# Patient Record
Sex: MALE | Race: WHITE | NOT HISPANIC OR LATINO | Employment: UNEMPLOYED | URBAN - METROPOLITAN AREA
[De-identification: names, ages, dates, MRNs, and addresses within clinical notes are randomized per-mention and may not be internally consistent; named-entity substitution may affect disease eponyms.]

---

## 2017-06-14 ENCOUNTER — HOSPITAL ENCOUNTER (EMERGENCY)
Facility: HOSPITAL | Age: 32
Discharge: HOME | End: 2017-06-14
Attending: EMERGENCY MEDICINE
Payer: MEDICAID

## 2017-06-14 VITALS
RESPIRATION RATE: 20 BRPM | SYSTOLIC BLOOD PRESSURE: 127 MMHG | OXYGEN SATURATION: 98 % | HEART RATE: 83 BPM | BODY MASS INDEX: 24.79 KG/M2 | WEIGHT: 183 LBS | DIASTOLIC BLOOD PRESSURE: 74 MMHG | HEIGHT: 72 IN | TEMPERATURE: 98 F

## 2017-06-14 DIAGNOSIS — S91.011A LACERATION OF RIGHT ANKLE, INITIAL ENCOUNTER: ICD-10-CM

## 2017-06-14 DIAGNOSIS — T14.90XA TRAUMA: ICD-10-CM

## 2017-06-14 DIAGNOSIS — S01.81XA FOREHEAD LACERATION, INITIAL ENCOUNTER: ICD-10-CM
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
## 2017-06-14 DIAGNOSIS — S92.301A FRACTURE OF UNSPECIFIED METATARSAL BONE(S), RIGHT FOOT, INITIAL ENCOUNTER FOR CLOSED FRACTURE: Primary | ICD-10-CM

## 2017-06-14 LAB
ABO + RH BLD: NORMAL
ALBUMIN SERPL BCP-MCNC: 4.3 G/DL
ALP SERPL-CCNC: 93 U/L
ALT SERPL W/O P-5'-P-CCNC: 23 U/L
AMPHET+METHAMPHET UR QL: NEGATIVE
ANION GAP SERPL CALC-SCNC: 12 MMOL/L
AST SERPL-CCNC: 19 U/L
BARBITURATES UR QL SCN>200 NG/ML: NEGATIVE
BASOPHILS # BLD AUTO: 0 K/UL
BASOPHILS NFR BLD: 0.3 %
BENZODIAZ UR QL SCN>200 NG/ML: NEGATIVE
BILIRUB SERPL-MCNC: 0.4 MG/DL
BILIRUB UR QL STRIP: NEGATIVE
BLD GP AB SCN CELLS X3 SERPL QL: NORMAL
BUN SERPL-MCNC: 9 MG/DL
BZE UR QL SCN: NEGATIVE
CALCIUM SERPL-MCNC: 9.4 MG/DL
CANNABINOIDS UR QL SCN: NEGATIVE
CHLORIDE SERPL-SCNC: 107 MMOL/L
CLARITY UR: CLEAR
CO2 SERPL-SCNC: 24 MMOL/L
COLOR UR: YELLOW
CREAT SERPL-MCNC: 0.9 MG/DL
CREAT UR-MCNC: 89.1 MG/DL
DIFFERENTIAL METHOD: ABNORMAL
EOSINOPHIL # BLD AUTO: 0.2 K/UL
EOSINOPHIL NFR BLD: 1.5 %
ERYTHROCYTE [DISTWIDTH] IN BLOOD BY AUTOMATED COUNT: 13.2 %
EST. GFR  (AFRICAN AMERICAN): >60 ML/MIN/1.73 M^2
EST. GFR  (NON AFRICAN AMERICAN): >60 ML/MIN/1.73 M^2
ETHANOL SERPL-MCNC: <10 MG/DL
GLUCOSE SERPL-MCNC: 125 MG/DL
GLUCOSE UR QL STRIP: NEGATIVE
HCT VFR BLD AUTO: 47.6 %
HGB BLD-MCNC: 16.1 G/DL
HGB UR QL STRIP: NEGATIVE
KETONES UR QL STRIP: NEGATIVE
LEUKOCYTE ESTERASE UR QL STRIP: NEGATIVE
LYMPHOCYTES # BLD AUTO: 5 K/UL
LYMPHOCYTES NFR BLD: 35.2 %
MCH RBC QN AUTO: 29.8 PG
MCHC RBC AUTO-ENTMCNC: 33.9 %
MCV RBC AUTO: 88 FL
METHADONE UR QL SCN>300 NG/ML: NEGATIVE
MONOCYTES # BLD AUTO: 1.3 K/UL
MONOCYTES NFR BLD: 9.2 %
NEUTROPHILS # BLD AUTO: 7.6 K/UL
NEUTROPHILS NFR BLD: 53.8 %
NITRITE UR QL STRIP: NEGATIVE
OPIATES UR QL SCN: NEGATIVE
PCP UR QL SCN>25 NG/ML: NEGATIVE
PH UR STRIP: 6 [PH] (ref 5–8)
PLATELET # BLD AUTO: 252 K/UL
PMV BLD AUTO: 9.5 FL
POTASSIUM SERPL-SCNC: 3.5 MMOL/L
PROT SERPL-MCNC: 7.4 G/DL
PROT UR QL STRIP: NEGATIVE
RBC # BLD AUTO: 5.41 M/UL
SODIUM SERPL-SCNC: 143 MMOL/L
SP GR UR STRIP: 1.01 (ref 1–1.03)
TOXICOLOGY INFORMATION: NORMAL
URN SPEC COLLECT METH UR: NORMAL
UROBILINOGEN UR STRIP-ACNC: NEGATIVE EU/DL
WBC # BLD AUTO: 14.1 K/UL

## 2017-06-14 PROCEDURE — 12002 RPR S/N/AX/GEN/TRNK2.6-7.5CM: CPT

## 2017-06-14 PROCEDURE — 86900 BLOOD TYPING SEROLOGIC ABO: CPT

## 2017-06-14 PROCEDURE — 90715 TDAP VACCINE 7 YRS/> IM: CPT

## 2017-06-14 PROCEDURE — 85025 COMPLETE CBC W/AUTO DIFF WBC: CPT

## 2017-06-14 PROCEDURE — 80053 COMPREHEN METABOLIC PANEL: CPT

## 2017-06-14 PROCEDURE — 96375 TX/PRO/DX INJ NEW DRUG ADDON: CPT

## 2017-06-14 PROCEDURE — 96365 THER/PROPH/DIAG IV INF INIT: CPT

## 2017-06-14 PROCEDURE — 80320 DRUG SCREEN QUANTALCOHOLS: CPT

## 2017-06-14 PROCEDURE — 86901 BLOOD TYPING SEROLOGIC RH(D): CPT

## 2017-06-14 PROCEDURE — 90471 IMMUNIZATION ADMIN: CPT

## 2017-06-14 PROCEDURE — 36415 COLL VENOUS BLD VENIPUNCTURE: CPT

## 2017-06-14 PROCEDURE — 63600175 PHARM REV CODE 636 W HCPCS: Performed by: EMERGENCY MEDICINE

## 2017-06-14 PROCEDURE — 25500020 PHARM REV CODE 255

## 2017-06-14 PROCEDURE — 25000003 PHARM REV CODE 250: Performed by: EMERGENCY MEDICINE

## 2017-06-14 PROCEDURE — 99285 EMERGENCY DEPT VISIT HI MDM: CPT | Mod: 25

## 2017-06-14 PROCEDURE — 12011 RPR F/E/E/N/L/M 2.5 CM/<: CPT

## 2017-06-14 PROCEDURE — 80307 DRUG TEST PRSMV CHEM ANLYZR: CPT

## 2017-06-14 PROCEDURE — 81003 URINALYSIS AUTO W/O SCOPE: CPT

## 2017-06-14 PROCEDURE — 63600175 PHARM REV CODE 636 W HCPCS

## 2017-06-14 RX ORDER — CEPHALEXIN 500 MG/1
500 CAPSULE ORAL 4 TIMES DAILY
Qty: 20 CAPSULE | Refills: 0 | Status: SHIPPED | OUTPATIENT
Start: 2017-06-14 | End: 2017-06-19

## 2017-06-14 RX ORDER — DEXTROMETHORPHAN HYDROBROMIDE, GUAIFENESIN 5; 100 MG/5ML; MG/5ML
650 LIQUID ORAL EVERY 8 HOURS
Qty: 30 TABLET | Refills: 0 | Status: SHIPPED | OUTPATIENT
Start: 2017-06-14 | End: 2017-07-03

## 2017-06-14 RX ORDER — ACETAMINOPHEN 10 MG/ML
1000 INJECTION, SOLUTION INTRAVENOUS
Status: COMPLETED | OUTPATIENT
Start: 2017-06-14 | End: 2017-06-14

## 2017-06-14 RX ORDER — IBUPROFEN 200 MG
1 TABLET ORAL
Status: DISCONTINUED | OUTPATIENT
Start: 2017-06-14 | End: 2017-06-14 | Stop reason: HOSPADM

## 2017-06-14 RX ORDER — IBUPROFEN 800 MG/1
800 TABLET ORAL EVERY 6 HOURS PRN
Qty: 30 TABLET | Refills: 0 | Status: SHIPPED | OUTPATIENT
Start: 2017-06-14 | End: 2017-07-12

## 2017-06-14 RX ORDER — CEFAZOLIN SODIUM 1 G/3ML
1 INJECTION, POWDER, FOR SOLUTION INTRAMUSCULAR; INTRAVENOUS
Status: DISCONTINUED | OUTPATIENT
Start: 2017-06-14 | End: 2017-06-14

## 2017-06-14 RX ORDER — FAMOTIDINE 20 MG/1
20 TABLET, FILM COATED ORAL 2 TIMES DAILY
Qty: 20 TABLET | Refills: 0 | Status: SHIPPED | OUTPATIENT
Start: 2017-06-14 | End: 2017-11-27

## 2017-06-14 RX ORDER — LIDOCAINE HYDROCHLORIDE 20 MG/ML
INJECTION, SOLUTION INFILTRATION; PERINEURAL
Status: DISCONTINUED
Start: 2017-06-14 | End: 2017-06-14 | Stop reason: HOSPADM

## 2017-06-14 RX ORDER — CEFAZOLIN SODIUM 1 G/50ML
SOLUTION INTRAVENOUS
Status: DISCONTINUED
Start: 2017-06-14 | End: 2017-06-14 | Stop reason: HOSPADM

## 2017-06-14 RX ORDER — CEFAZOLIN SODIUM 1 G/50ML
1 SOLUTION INTRAVENOUS ONCE
Status: COMPLETED | OUTPATIENT
Start: 2017-06-14 | End: 2017-06-14

## 2017-06-14 RX ORDER — KETOROLAC TROMETHAMINE 30 MG/ML
INJECTION, SOLUTION INTRAMUSCULAR; INTRAVENOUS
Status: DISCONTINUED
Start: 2017-06-14 | End: 2017-06-14 | Stop reason: HOSPADM

## 2017-06-14 RX ORDER — KETOROLAC TROMETHAMINE 30 MG/ML
10 INJECTION, SOLUTION INTRAMUSCULAR; INTRAVENOUS
Status: COMPLETED | OUTPATIENT
Start: 2017-06-14 | End: 2017-06-14

## 2017-06-14 RX ADMIN — CLOSTRIDIUM TETANI TOXOID ANTIGEN (FORMALDEHYDE INACTIVATED), CORYNEBACTERIUM DIPHTHERIAE TOXOID ANTIGEN (FORMALDEHYDE INACTIVATED), BORDETELLA PERTUSSIS TOXOID ANTIGEN (GLUTARALDEHYDE INACTIVATED), BORDETELLA PERTUSSIS FILAMENTOUS HEMAGGLUTININ ANTIGEN (FORMALDEHYDE INACTIVATED), BORDETELLA PERTUSSIS PERTACTIN ANTIGEN, AND BORDETELLA PERTUSSIS FIMBRIAE 2/3 ANTIGEN 0.5 ML: 5; 2; 2.5; 5; 3; 5 INJECTION, SUSPENSION INTRAMUSCULAR at 07:06

## 2017-06-14 RX ADMIN — KETOROLAC TROMETHAMINE 10 MG: 30 INJECTION, SOLUTION INTRAMUSCULAR at 08:06

## 2017-06-14 RX ADMIN — ACETAMINOPHEN 1000 MG: 10 INJECTION, SOLUTION INTRAVENOUS at 06:06

## 2017-06-14 RX ADMIN — SODIUM CHLORIDE, SODIUM LACTATE, POTASSIUM CHLORIDE, AND CALCIUM CHLORIDE 1000 ML: .6; .31; .03; .02 INJECTION, SOLUTION INTRAVENOUS at 06:06

## 2017-06-14 RX ADMIN — CEFAZOLIN SODIUM 1 G: 1 SOLUTION INTRAVENOUS at 06:06

## 2017-06-14 RX ADMIN — IOHEXOL 100 ML: 350 INJECTION, SOLUTION INTRAVENOUS at 06:06

## 2017-06-14 RX ADMIN — NICOTINE 1 PATCH: 21 PATCH, EXTENDED RELEASE TRANSDERMAL at 11:06
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
## 2017-06-14 NOTE — ED NOTES
Discharge instructions and prescriptions reviewed with patient and with his mom. Patient asked many questions and repeated the same questions many times, and all were answered. Crutches provided with training and patient demonstrated correct use.

## 2017-06-14 NOTE — DISCHARGE INSTRUCTIONS
If you would like to follow up with the [facility] for further care of your fracture, please ensure you have a CD with your x-rays or other images taken at your visit today.  Please bring this CD and your discharge papers to [facility], [address] [place] [phone] - first floor Registration Desk in the Ambulatory Care Building.  Upon receipt of your information and CD, you will be scheduled for follow up in the Orthopedic Clinic

## 2017-06-19 ENCOUNTER — HOSPITAL ENCOUNTER (EMERGENCY)
Facility: HOSPITAL | Age: 32
Discharge: HOME | End: 2017-06-19
Attending: EMERGENCY MEDICINE
Payer: MEDICAID

## 2017-06-19 VITALS
RESPIRATION RATE: 18 BRPM | BODY MASS INDEX: 24.79 KG/M2 | SYSTOLIC BLOOD PRESSURE: 123 MMHG | TEMPERATURE: 98 F | OXYGEN SATURATION: 97 % | HEART RATE: 79 BPM | DIASTOLIC BLOOD PRESSURE: 57 MMHG | HEIGHT: 72 IN | WEIGHT: 183 LBS

## 2017-06-19 DIAGNOSIS — Z51.89 ENCOUNTER FOR POST-TRAUMATIC WOUND CHECK: Primary | ICD-10-CM

## 2017-06-19 PROCEDURE — 29515 APPLICATION SHORT LEG SPLINT: CPT | Mod: RT

## 2017-06-19 PROCEDURE — 99284 EMERGENCY DEPT VISIT MOD MDM: CPT | Mod: 25

## 2017-06-19 PROCEDURE — 96372 THER/PROPH/DIAG INJ SC/IM: CPT

## 2017-06-19 PROCEDURE — 63600175 PHARM REV CODE 636 W HCPCS: Performed by: EMERGENCY MEDICINE

## 2017-06-19 RX ORDER — KETOROLAC TROMETHAMINE 30 MG/ML
30 INJECTION, SOLUTION INTRAMUSCULAR; INTRAVENOUS
Status: COMPLETED | OUTPATIENT
Start: 2017-06-19 | End: 2017-06-19

## 2017-06-19 RX ORDER — TRAMADOL HYDROCHLORIDE 50 MG/1
50 TABLET ORAL EVERY 8 HOURS PRN
Qty: 20 TABLET | Refills: 0 | Status: SHIPPED | OUTPATIENT
Start: 2017-06-19 | End: 2017-06-22

## 2017-06-19 RX ADMIN — KETOROLAC TROMETHAMINE 30 MG: 30 INJECTION, SOLUTION INTRAMUSCULAR at 12:06
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
## 2017-07-03 ENCOUNTER — HOSPITAL ENCOUNTER (EMERGENCY)
Facility: OTHER | Age: 32
Discharge: HOME | End: 2017-07-03
Attending: EMERGENCY MEDICINE
Payer: MEDICAID

## 2017-07-03 VITALS
TEMPERATURE: 98 F | HEART RATE: 70 BPM | SYSTOLIC BLOOD PRESSURE: 114 MMHG | HEIGHT: 71 IN | DIASTOLIC BLOOD PRESSURE: 65 MMHG | WEIGHT: 164 LBS | BODY MASS INDEX: 22.96 KG/M2 | OXYGEN SATURATION: 99 % | RESPIRATION RATE: 18 BRPM

## 2017-07-03 DIAGNOSIS — N34.2 URETHRITIS, UNSPECIFIED: Primary | ICD-10-CM

## 2017-07-03 DIAGNOSIS — Z48.02 VISIT FOR SUTURE REMOVAL: ICD-10-CM

## 2017-07-03 DIAGNOSIS — M79.671 RIGHT FOOT PAIN: ICD-10-CM

## 2017-07-03 LAB
BILIRUBIN, POC UA: ABNORMAL
BLOOD, POC UA: ABNORMAL
CLARITY, POC UA: ABNORMAL
COLOR, POC UA: YELLOW
GLUCOSE, POC UA: NEGATIVE
KETONES, POC UA: ABNORMAL
LEUKOCYTE EST, POC UA: ABNORMAL
NITRITE, POC UA: NEGATIVE
PH UR STRIP: 5.5 [PH]
PROTEIN, POC UA: ABNORMAL
SPECIFIC GRAVITY, POC UA: >=1.03
UROBILINOGEN, POC UA: 0.2 E.U./DL

## 2017-07-03 PROCEDURE — 99283 EMERGENCY DEPT VISIT LOW MDM: CPT | Mod: 25

## 2017-07-03 PROCEDURE — 87591 N.GONORRHOEAE DNA AMP PROB: CPT

## 2017-07-03 PROCEDURE — 96372 THER/PROPH/DIAG INJ SC/IM: CPT

## 2017-07-03 PROCEDURE — 25000003 PHARM REV CODE 250: Performed by: EMERGENCY MEDICINE

## 2017-07-03 PROCEDURE — 81003 URINALYSIS AUTO W/O SCOPE: CPT

## 2017-07-03 PROCEDURE — 63600175 PHARM REV CODE 636 W HCPCS: Performed by: EMERGENCY MEDICINE

## 2017-07-03 RX ORDER — FLUCONAZOLE 200 MG/1
200 TABLET ORAL ONCE
Qty: 1 TABLET | Refills: 0 | Status: SHIPPED | OUTPATIENT
Start: 2017-07-03 | End: 2017-07-03

## 2017-07-03 RX ORDER — KETOROLAC TROMETHAMINE 30 MG/ML
60 INJECTION, SOLUTION INTRAMUSCULAR; INTRAVENOUS
Status: COMPLETED | OUTPATIENT
Start: 2017-07-03 | End: 2017-07-03

## 2017-07-03 RX ORDER — AZITHROMYCIN 250 MG/1
1000 TABLET, FILM COATED ORAL
Status: COMPLETED | OUTPATIENT
Start: 2017-07-03 | End: 2017-07-03

## 2017-07-03 RX ORDER — KETOROLAC TROMETHAMINE 10 MG/1
10 TABLET, FILM COATED ORAL EVERY 6 HOURS
Qty: 20 TABLET | Refills: 0 | Status: SHIPPED | OUTPATIENT
Start: 2017-07-03 | End: 2017-11-27

## 2017-07-03 RX ORDER — OXYCODONE AND ACETAMINOPHEN 5; 325 MG/1; MG/1
1 TABLET ORAL EVERY 4 HOURS PRN
COMMUNITY
End: 2017-11-27

## 2017-07-03 RX ORDER — CEFTRIAXONE 250 MG/1
250 INJECTION, POWDER, FOR SOLUTION INTRAMUSCULAR; INTRAVENOUS
Status: COMPLETED | OUTPATIENT
Start: 2017-07-03 | End: 2017-07-03

## 2017-07-03 RX ORDER — OXYCODONE AND ACETAMINOPHEN 5; 325 MG/1; MG/1
2 TABLET ORAL
Status: COMPLETED | OUTPATIENT
Start: 2017-07-03 | End: 2017-07-03

## 2017-07-03 RX ADMIN — OXYCODONE AND ACETAMINOPHEN 2 TABLET: 5; 325 TABLET ORAL at 02:07

## 2017-07-03 RX ADMIN — KETOROLAC TROMETHAMINE 60 MG: 30 INJECTION, SOLUTION INTRAMUSCULAR at 02:07

## 2017-07-03 RX ADMIN — CEFTRIAXONE SODIUM 250 MG: 250 INJECTION, POWDER, FOR SOLUTION INTRAMUSCULAR; INTRAVENOUS at 02:07

## 2017-07-03 RX ADMIN — AZITHROMYCIN 1000 MG: 250 TABLET, FILM COATED ORAL at 02:07

## 2017-07-03 NOTE — ED TRIAGE NOTES
Severe burning with urination and white D/C out head of penis / extreme pain with erection    ongoing about 3 days
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
## 2017-07-04 LAB
C TRACH DNA SPEC QL NAA+PROBE: NOT DETECTED
N GONORRHOEA DNA SPEC QL NAA+PROBE: DETECTED
N GONORRHOEA DNA SPEC QL NAA+PROBE: DETECTED
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
## 2017-07-06 ENCOUNTER — TELEPHONE (OUTPATIENT)
Dept: EMERGENCY MEDICINE | Facility: OTHER | Age: 32
End: 2017-07-06

## 2017-07-12 ENCOUNTER — HOSPITAL ENCOUNTER (EMERGENCY)
Facility: OTHER | Age: 32
Discharge: HOME | End: 2017-07-12
Attending: INTERNAL MEDICINE
Payer: MEDICAID

## 2017-07-12 VITALS
SYSTOLIC BLOOD PRESSURE: 107 MMHG | HEART RATE: 95 BPM | WEIGHT: 165 LBS | OXYGEN SATURATION: 97 % | DIASTOLIC BLOOD PRESSURE: 68 MMHG | BODY MASS INDEX: 23.1 KG/M2 | TEMPERATURE: 98 F | HEIGHT: 71 IN

## 2017-07-12 DIAGNOSIS — M79.604 RIGHT LEG PAIN: ICD-10-CM

## 2017-07-12 DIAGNOSIS — M54.2 BILATERAL NECK PAIN: Primary | ICD-10-CM

## 2017-07-12 PROCEDURE — 99283 EMERGENCY DEPT VISIT LOW MDM: CPT

## 2017-07-12 RX ORDER — IBUPROFEN 800 MG/1
800 TABLET ORAL EVERY 8 HOURS PRN
Qty: 30 TABLET | Refills: 0 | Status: SHIPPED | OUTPATIENT
Start: 2017-07-12 | End: 2017-11-27

## 2017-07-12 NOTE — ED NOTES
Patient has a cast to right lower extremity, cast intact, color pink, able to move digits, area warm to touch and sensation intact. Patient verbalized a complaint of joint pain.  Patient states he missed his orthopedic appointment at [facility] and he is attempting to get another one
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
## 2017-11-27 ENCOUNTER — HOSPITAL ENCOUNTER (EMERGENCY)
Facility: HOSPITAL | Age: 32
Discharge: HOME | End: 2017-11-27
Attending: EMERGENCY MEDICINE
Payer: MEDICAID

## 2017-11-27 VITALS
DIASTOLIC BLOOD PRESSURE: 70 MMHG | HEIGHT: 69 IN | TEMPERATURE: 98 F | RESPIRATION RATE: 18 BRPM | SYSTOLIC BLOOD PRESSURE: 125 MMHG | BODY MASS INDEX: 25.18 KG/M2 | OXYGEN SATURATION: 100 % | WEIGHT: 170 LBS | HEART RATE: 80 BPM

## 2017-11-27 DIAGNOSIS — F15.10 AMPHETAMINE ABUSE: ICD-10-CM

## 2017-11-27 DIAGNOSIS — R06.02 SHORTNESS OF BREATH: ICD-10-CM

## 2017-11-27 DIAGNOSIS — F14.10 COCAINE ABUSE: Primary | ICD-10-CM

## 2017-11-27 LAB
AMPHET+METHAMPHET UR QL: NORMAL
ANION GAP SERPL CALC-SCNC: 9 MMOL/L
BARBITURATES UR QL SCN>200 NG/ML: NEGATIVE
BASOPHILS # BLD AUTO: 0.02 K/UL
BASOPHILS NFR BLD: 0.2 %
BENZODIAZ UR QL SCN>200 NG/ML: NEGATIVE
BUN SERPL-MCNC: 16 MG/DL
BZE UR QL SCN: NORMAL
CALCIUM SERPL-MCNC: 9.5 MG/DL
CANNABINOIDS UR QL SCN: NEGATIVE
CHLORIDE SERPL-SCNC: 107 MMOL/L
CO2 SERPL-SCNC: 25 MMOL/L
CREAT SERPL-MCNC: 0.9 MG/DL
CREAT UR-MCNC: 229 MG/DL
DIFFERENTIAL METHOD: NORMAL
EOSINOPHIL # BLD AUTO: 0.2 K/UL
EOSINOPHIL NFR BLD: 2.1 %
ERYTHROCYTE [DISTWIDTH] IN BLOOD BY AUTOMATED COUNT: 12.9 %
EST. GFR  (AFRICAN AMERICAN): >60 ML/MIN/1.73 M^2
EST. GFR  (NON AFRICAN AMERICAN): >60 ML/MIN/1.73 M^2
ETHANOL SERPL-MCNC: <10 MG/DL
GLUCOSE SERPL-MCNC: 110 MG/DL
HCT VFR BLD AUTO: 45.5 %
HGB BLD-MCNC: 16.1 G/DL
LYMPHOCYTES # BLD AUTO: 2.3 K/UL
LYMPHOCYTES NFR BLD: 27.6 %
MCH RBC QN AUTO: 29.9 PG
MCHC RBC AUTO-ENTMCNC: 35.4 G/DL
MCV RBC AUTO: 84 FL
METHADONE UR QL SCN>300 NG/ML: NEGATIVE
MONOCYTES # BLD AUTO: 1 K/UL
MONOCYTES NFR BLD: 11.6 %
NEUTROPHILS # BLD AUTO: 4.8 K/UL
NEUTROPHILS NFR BLD: 58.4 %
OPIATES UR QL SCN: NEGATIVE
PCP UR QL SCN>25 NG/ML: NEGATIVE
PLATELET # BLD AUTO: 259 K/UL
PMV BLD AUTO: 10.9 FL
POTASSIUM SERPL-SCNC: 2.9 MMOL/L
RBC # BLD AUTO: 5.39 M/UL
SODIUM SERPL-SCNC: 141 MMOL/L
TOXICOLOGY INFORMATION: NORMAL
TROPONIN I SERPL DL<=0.01 NG/ML-MCNC: 0.01 NG/ML
WBC # BLD AUTO: 8.16 K/UL

## 2017-11-27 PROCEDURE — 93010 ELECTROCARDIOGRAM REPORT: CPT | Mod: ,,, | Performed by: INTERNAL MEDICINE

## 2017-11-27 PROCEDURE — 80307 DRUG TEST PRSMV CHEM ANLYZR: CPT

## 2017-11-27 PROCEDURE — 25000003 PHARM REV CODE 250: Performed by: EMERGENCY MEDICINE

## 2017-11-27 PROCEDURE — 99284 EMERGENCY DEPT VISIT MOD MDM: CPT

## 2017-11-27 PROCEDURE — 84484 ASSAY OF TROPONIN QUANT: CPT

## 2017-11-27 PROCEDURE — 80048 BASIC METABOLIC PNL TOTAL CA: CPT

## 2017-11-27 PROCEDURE — 85025 COMPLETE CBC W/AUTO DIFF WBC: CPT

## 2017-11-27 PROCEDURE — 80320 DRUG SCREEN QUANTALCOHOLS: CPT

## 2017-11-27 PROCEDURE — 93005 ELECTROCARDIOGRAM TRACING: CPT

## 2017-11-27 RX ORDER — LORAZEPAM 0.5 MG/1
1 TABLET ORAL
Status: COMPLETED | OUTPATIENT
Start: 2017-11-27 | End: 2017-11-27

## 2017-11-27 RX ADMIN — LORAZEPAM 1 MG: 0.5 TABLET ORAL at 08:11

## 2017-11-27 NOTE — ED NOTES
Pt lying in bed resting comfortably, eyes closed. Pt appears calm. Mother at bedside. REU. Side rails x 2. Bed in lowest position and call light in reach.
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
## 2017-11-27 NOTE — ED TRIAGE NOTES
"Pt arrived to ED via personal transportation with mom for c/o chest pain. Rates pain 3 out of 0. Pt appears very anxious, speaking bizarrely, and jittery. Pt states "I over work myself, I dont eat right or take care of my body". Pt also states he feels like he is having an anxiety/stress attack. Mom states this episode started about an hour ago. MD at beside. AAO x 4. Will continue to monitor.   "

## 2018-05-01 ENCOUNTER — HOSPITAL ENCOUNTER (EMERGENCY)
Facility: HOSPITAL | Age: 33
Discharge: HOME | End: 2018-05-01
Attending: EMERGENCY MEDICINE
Payer: MEDICAID

## 2018-05-01 VITALS
DIASTOLIC BLOOD PRESSURE: 72 MMHG | HEART RATE: 80 BPM | BODY MASS INDEX: 23.94 KG/M2 | HEIGHT: 71 IN | WEIGHT: 171 LBS | SYSTOLIC BLOOD PRESSURE: 120 MMHG | TEMPERATURE: 98 F | RESPIRATION RATE: 18 BRPM | OXYGEN SATURATION: 99 %

## 2018-05-01 DIAGNOSIS — M54.2 NECK PAIN: Primary | ICD-10-CM

## 2018-05-01 PROCEDURE — 96372 THER/PROPH/DIAG INJ SC/IM: CPT

## 2018-05-01 PROCEDURE — 25000003 PHARM REV CODE 250: Performed by: PHYSICIAN ASSISTANT

## 2018-05-01 PROCEDURE — 99283 EMERGENCY DEPT VISIT LOW MDM: CPT | Mod: 25

## 2018-05-01 PROCEDURE — 63600175 PHARM REV CODE 636 W HCPCS: Performed by: PHYSICIAN ASSISTANT

## 2018-05-01 RX ORDER — IBUPROFEN 800 MG/1
800 TABLET ORAL 3 TIMES DAILY
COMMUNITY
End: 2021-10-23 | Stop reason: ALTCHOICE

## 2018-05-01 RX ORDER — ETODOLAC 200 MG/1
200 CAPSULE ORAL 3 TIMES DAILY PRN
Qty: 20 CAPSULE | Refills: 0 | Status: SHIPPED | OUTPATIENT
Start: 2018-05-01 | End: 2018-05-08

## 2018-05-01 RX ORDER — KETOROLAC TROMETHAMINE 30 MG/ML
15 INJECTION, SOLUTION INTRAMUSCULAR; INTRAVENOUS
Status: COMPLETED | OUTPATIENT
Start: 2018-05-01 | End: 2018-05-01

## 2018-05-01 RX ORDER — LIDOCAINE 50 MG/G
1 PATCH TOPICAL
Status: DISCONTINUED | OUTPATIENT
Start: 2018-05-01 | End: 2018-05-01 | Stop reason: HOSPADM

## 2018-05-01 RX ORDER — ACETAMINOPHEN 500 MG
500 TABLET ORAL EVERY 4 HOURS PRN
Qty: 20 TABLET | Refills: 0 | Status: SHIPPED | OUTPATIENT
Start: 2018-05-01 | End: 2018-05-06

## 2018-05-01 RX ORDER — LIDOCAINE 50 MG/G
1 PATCH TOPICAL DAILY
Qty: 15 PATCH | Refills: 0 | Status: SHIPPED | OUTPATIENT
Start: 2018-05-01 | End: 2018-05-16

## 2018-05-01 RX ORDER — ORPHENADRINE CITRATE 30 MG/ML
30 INJECTION INTRAMUSCULAR; INTRAVENOUS
Status: COMPLETED | OUTPATIENT
Start: 2018-05-01 | End: 2018-05-01

## 2018-05-01 RX ORDER — CYCLOBENZAPRINE HCL 10 MG
10 TABLET ORAL 3 TIMES DAILY PRN
Qty: 20 TABLET | Refills: 0 | Status: SHIPPED | OUTPATIENT
Start: 2018-05-01 | End: 2018-05-08

## 2018-05-01 RX ADMIN — KETOROLAC TROMETHAMINE 15 MG: 30 INJECTION, SOLUTION INTRAMUSCULAR; INTRAVENOUS at 02:05

## 2018-05-01 RX ADMIN — ORPHENADRINE CITRATE 30 MG: 30 INJECTION INTRAMUSCULAR; INTRAVENOUS at 02:05

## 2018-05-01 RX ADMIN — LIDOCAINE 1 PATCH: 50 PATCH TOPICAL at 02:05
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
## 2018-05-01 NOTE — DISCHARGE INSTRUCTIONS
Read attached instructions.     STOP taking Ibuprofen and begin taking Lodine. If you feel that Ibuprofen is working better, you can stop taking Lodine and begin retaking Ibuprofen. Take Tylenol as prescribed. Take Flexeril (muscle relaxer) as prescribed. Do not take before driving or work because can cause drowsiness. Apply Lidoderm patches as needed as prescribed.     Follow up with primary care and Orthopedics in 2 days. Your symptoms may be due to arthritis seen on your CT scan last year. Bring copies of your CT results to follow up. Return to ER for worsening symptoms or as needed.

## 2018-05-01 NOTE — ED TRIAGE NOTES
Pt presents to the ED with c/o neck and back irritation x 1 year. Pt states that its getting worse and thats why he cam in. Its getting hard to lay,stand or sit. Reports feeling it all of the time. He would like a referral to rehab or pain management. Reports taking 2 ibuprofen 200mg and 1 norco 3 hours PTA

## 2018-05-02 ENCOUNTER — PES CALL (OUTPATIENT)
Dept: ADMINISTRATIVE | Facility: CLINIC | Age: 33
End: 2018-05-02

## 2018-05-11 ENCOUNTER — TELEPHONE (OUTPATIENT)
Dept: ORTHOPEDICS | Facility: CLINIC | Age: 33
End: 2018-05-11
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
## 2018-07-27 ENCOUNTER — HOSPITAL ENCOUNTER (EMERGENCY)
Facility: HOSPITAL | Age: 33
Discharge: HOME | End: 2018-07-27
Attending: EMERGENCY MEDICINE
Payer: MEDICAID

## 2018-07-27 VITALS
HEART RATE: 59 BPM | RESPIRATION RATE: 18 BRPM | BODY MASS INDEX: 23.57 KG/M2 | TEMPERATURE: 98 F | OXYGEN SATURATION: 98 % | WEIGHT: 174 LBS | DIASTOLIC BLOOD PRESSURE: 71 MMHG | SYSTOLIC BLOOD PRESSURE: 123 MMHG | HEIGHT: 72 IN
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
## 2018-07-27 DIAGNOSIS — R51.9 HEADACHE AROUND THE EYES: ICD-10-CM

## 2018-07-27 PROCEDURE — 99284 EMERGENCY DEPT VISIT MOD MDM: CPT | Mod: 25

## 2018-07-27 PROCEDURE — 25000003 PHARM REV CODE 250: Performed by: EMERGENCY MEDICINE

## 2018-07-27 RX ORDER — ONDANSETRON 4 MG/1
4 TABLET, ORALLY DISINTEGRATING ORAL
Status: COMPLETED | OUTPATIENT
Start: 2018-07-27 | End: 2018-07-27

## 2018-07-27 RX ORDER — NAPROXEN 500 MG/1
500 TABLET ORAL
Status: COMPLETED | OUTPATIENT
Start: 2018-07-27 | End: 2018-07-27

## 2018-07-27 RX ADMIN — ONDANSETRON 4 MG: 4 TABLET, ORALLY DISINTEGRATING ORAL at 09:07

## 2018-07-27 RX ADMIN — NAPROXEN 500 MG: 500 TABLET ORAL at 09:07
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
## 2019-05-10 ENCOUNTER — HOSPITAL ENCOUNTER (EMERGENCY)
Facility: HOSPITAL | Age: 34
Discharge: HOME | End: 2019-05-10
Attending: EMERGENCY MEDICINE
Payer: MEDICAID

## 2019-05-10 VITALS
RESPIRATION RATE: 18 BRPM | DIASTOLIC BLOOD PRESSURE: 71 MMHG | TEMPERATURE: 98 F | SYSTOLIC BLOOD PRESSURE: 125 MMHG | HEART RATE: 75 BPM | OXYGEN SATURATION: 98 % | HEIGHT: 71 IN | WEIGHT: 190 LBS | BODY MASS INDEX: 26.6 KG/M2

## 2019-05-10 DIAGNOSIS — M43.6 LEFT TORTICOLLIS: Primary | ICD-10-CM

## 2019-05-10 PROCEDURE — 25000003 PHARM REV CODE 250: Performed by: PHYSICIAN ASSISTANT

## 2019-05-10 PROCEDURE — 99284 EMERGENCY DEPT VISIT MOD MDM: CPT

## 2019-05-10 RX ORDER — ORPHENADRINE CITRATE 100 MG/1
100 TABLET, EXTENDED RELEASE ORAL 2 TIMES DAILY
Qty: 8 TABLET | Refills: 0 | Status: SHIPPED | OUTPATIENT
Start: 2019-05-10 | End: 2019-05-14

## 2019-05-10 RX ORDER — IBUPROFEN 600 MG/1
600 TABLET ORAL
Status: COMPLETED | OUTPATIENT
Start: 2019-05-10 | End: 2019-05-10

## 2019-05-10 RX ORDER — MELOXICAM 7.5 MG/1
7.5 TABLET ORAL DAILY
Qty: 12 TABLET | Refills: 0 | Status: SHIPPED | OUTPATIENT
Start: 2019-05-10 | End: 2021-10-23 | Stop reason: ALTCHOICE

## 2019-05-10 RX ORDER — ACETAMINOPHEN 500 MG
1000 TABLET ORAL
Status: COMPLETED | OUTPATIENT
Start: 2019-05-10 | End: 2019-05-10

## 2019-05-10 RX ADMIN — IBUPROFEN 600 MG: 600 TABLET ORAL at 04:05

## 2019-05-10 RX ADMIN — ACETAMINOPHEN 1000 MG: 500 TABLET, FILM COATED ORAL at 04:05
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
## 2021-10-23 ENCOUNTER — HOSPITAL ENCOUNTER (EMERGENCY)
Facility: HOSPITAL | Age: 36
Discharge: HOME | End: 2021-10-23
Attending: EMERGENCY MEDICINE
Payer: COMMERCIAL

## 2021-10-23 VITALS
OXYGEN SATURATION: 100 % | WEIGHT: 185 LBS | DIASTOLIC BLOOD PRESSURE: 68 MMHG | HEIGHT: 71 IN | TEMPERATURE: 99 F | RESPIRATION RATE: 16 BRPM | SYSTOLIC BLOOD PRESSURE: 125 MMHG | BODY MASS INDEX: 25.9 KG/M2 | HEART RATE: 78 BPM
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
## 2021-10-23 DIAGNOSIS — M25.512 ACUTE PAIN OF LEFT SHOULDER: ICD-10-CM

## 2021-10-23 DIAGNOSIS — S16.1XXA CERVICAL MYOFASCIAL STRAIN, INITIAL ENCOUNTER: ICD-10-CM

## 2021-10-23 PROCEDURE — 96372 THER/PROPH/DIAG INJ SC/IM: CPT

## 2021-10-23 PROCEDURE — 63600175 PHARM REV CODE 636 W HCPCS: Performed by: NURSE PRACTITIONER

## 2021-10-23 PROCEDURE — 99284 EMERGENCY DEPT VISIT MOD MDM: CPT | Mod: 25

## 2021-10-23 RX ORDER — DEXAMETHASONE SODIUM PHOSPHATE 4 MG/ML
12 INJECTION, SOLUTION INTRA-ARTICULAR; INTRALESIONAL; INTRAMUSCULAR; INTRAVENOUS; SOFT TISSUE
Status: COMPLETED | OUTPATIENT
Start: 2021-10-23 | End: 2021-10-23

## 2021-10-23 RX ORDER — NAPROXEN 500 MG/1
500 TABLET ORAL EVERY 12 HOURS PRN
Qty: 20 TABLET | Refills: 0 | Status: SHIPPED | OUTPATIENT
Start: 2021-10-23

## 2021-10-23 RX ORDER — TIZANIDINE 2 MG/1
2 TABLET ORAL EVERY 8 HOURS PRN
Qty: 15 TABLET | Refills: 0 | Status: SHIPPED | OUTPATIENT
Start: 2021-10-23

## 2021-10-23 RX ADMIN — DEXAMETHASONE SODIUM PHOSPHATE 12 MG: 4 INJECTION INTRA-ARTICULAR; INTRALESIONAL; INTRAMUSCULAR; INTRAVENOUS; SOFT TISSUE at 08:10

## 2024-04-17 ENCOUNTER — HOSPITAL ENCOUNTER (EMERGENCY)
Facility: HOSPITAL | Age: 39
Discharge: HOME | End: 2024-04-17
Attending: EMERGENCY MEDICINE
Payer: MEDICAID

## 2024-04-17 VITALS
SYSTOLIC BLOOD PRESSURE: 120 MMHG | OXYGEN SATURATION: 98 % | RESPIRATION RATE: 16 BRPM | DIASTOLIC BLOOD PRESSURE: 81 MMHG | WEIGHT: 218 LBS | HEART RATE: 72 BPM | HEIGHT: 71 IN | BODY MASS INDEX: 30.52 KG/M2 | TEMPERATURE: 98 F

## 2024-04-17 DIAGNOSIS — M54.50 CHRONIC LOW BACK PAIN WITHOUT SCIATICA, UNSPECIFIED BACK PAIN LATERALITY: ICD-10-CM

## 2024-04-17 DIAGNOSIS — S96.911A STRAIN OF RIGHT ANKLE, INITIAL ENCOUNTER: ICD-10-CM

## 2024-04-17 DIAGNOSIS — L60.0 INGROWN TOENAIL OF LEFT FOOT: ICD-10-CM

## 2024-04-17 DIAGNOSIS — L60.0 INGROWN TOENAIL OF RIGHT FOOT: ICD-10-CM

## 2024-04-17 DIAGNOSIS — R09.81 NASAL CONGESTION: ICD-10-CM

## 2024-04-17 DIAGNOSIS — M25.579 ANKLE PAIN: Primary | ICD-10-CM

## 2024-04-17 DIAGNOSIS — G89.29 CHRONIC LOW BACK PAIN WITHOUT SCIATICA, UNSPECIFIED BACK PAIN LATERALITY: ICD-10-CM

## 2024-04-17 LAB
CTP QC/QA: YES
CTP QC/QA: YES
POC MOLECULAR INFLUENZA A AGN: NEGATIVE
POC MOLECULAR INFLUENZA B AGN: NEGATIVE
POCT GLUCOSE: 129 MG/DL (ref 70–110)
SARS-COV-2 RDRP RESP QL NAA+PROBE: NEGATIVE

## 2024-04-17 PROCEDURE — 87502 INFLUENZA DNA AMP PROBE: CPT

## 2024-04-17 PROCEDURE — 87635 SARS-COV-2 COVID-19 AMP PRB: CPT

## 2024-04-17 PROCEDURE — 96372 THER/PROPH/DIAG INJ SC/IM: CPT

## 2024-04-17 PROCEDURE — 63600175 PHARM REV CODE 636 W HCPCS

## 2024-04-17 PROCEDURE — 82962 GLUCOSE BLOOD TEST: CPT

## 2024-04-17 PROCEDURE — 99284 EMERGENCY DEPT VISIT MOD MDM: CPT | Mod: 25

## 2024-04-17 PROCEDURE — 25000003 PHARM REV CODE 250

## 2024-04-17 RX ORDER — METHOCARBAMOL 500 MG/1
1000 TABLET, FILM COATED ORAL 3 TIMES DAILY
Qty: 30 TABLET | Refills: 0 | Status: SHIPPED | OUTPATIENT
Start: 2024-04-17 | End: 2024-04-22

## 2024-04-17 RX ORDER — ACETAMINOPHEN 500 MG
1000 TABLET ORAL
Status: COMPLETED | OUTPATIENT
Start: 2024-04-17 | End: 2024-04-17

## 2024-04-17 RX ORDER — KETOROLAC TROMETHAMINE 30 MG/ML
30 INJECTION, SOLUTION INTRAMUSCULAR; INTRAVENOUS
Status: COMPLETED | OUTPATIENT
Start: 2024-04-17 | End: 2024-04-17

## 2024-04-17 RX ORDER — CETIRIZINE HYDROCHLORIDE 10 MG/1
10 TABLET ORAL DAILY
Qty: 30 TABLET | Refills: 0 | Status: SHIPPED | OUTPATIENT
Start: 2024-04-17 | End: 2024-05-17

## 2024-04-17 RX ORDER — ACETAMINOPHEN 500 MG
500 TABLET ORAL EVERY 6 HOURS PRN
Qty: 30 TABLET | Refills: 0 | Status: SHIPPED | OUTPATIENT
Start: 2024-04-17

## 2024-04-17 RX ORDER — LIDOCAINE 50 MG/G
1 PATCH TOPICAL DAILY
Qty: 15 PATCH | Refills: 0 | Status: SHIPPED | OUTPATIENT
Start: 2024-04-17

## 2024-04-17 RX ADMIN — KETOROLAC TROMETHAMINE 30 MG: 30 INJECTION, SOLUTION INTRAMUSCULAR at 10:04

## 2024-04-17 RX ADMIN — ACETAMINOPHEN 1000 MG: 500 TABLET ORAL at 10:04
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
## 2024-04-18 NOTE — DISCHARGE INSTRUCTIONS
Thank you for coming to our Emergency Department today. It is important to remember that some problems or medical conditions are difficult to diagnose and may not be found or addressed during your Emergency Department visit.  These conditions often start with non-specific symptoms and can only be diagnosed on follow up visits with your primary care physician or specialist when the symptoms continue or change. Please remember that all medical conditions can change, and we cannot predict how you will be feeling tomorrow or the next day. Return to the ER with any questions/concerns, new/concerning symptoms, worsening or failure to improve. Also, please follow up with your Primary Care Physician and/or Pediatrician in the next 1-2 days to review your ED visit in entirety and for re-evaluation.   Be sure to follow up with your primary care doctor and review all labs/imaging/tests that were performed during your ER visit with them. It is very common for us to identify non-emergent incidental findings which must be followed up with your primary care physician.  Some labs/imaging/tests may be outside of the normal range, and require non-emergent follow-up and/or further investigation/treatment/procedures/testing to help diagnose/exclude/prevent complications or other potentially serious medical conditions. Some abnormalities may not have been discussed or addressed during your ER visit. Some lab results may not return during your ER visit but can be accessible by downloading the free [facility] minna or by visiting [email or web address] . It is important for you to review all labs/imaging/tests which are outside of the normal range with your physician.  An ER visit does not replace a primary care visit, and many screening tests or follow-up tests cannot be ordered by an ER doctor or performed by the ER. Some tests may even require pre-approval.  If you do not have a primary care doctor, you may contact the one listed  on your discharge paperwork or you may also call the [facility] Appointment Desk at [phone] , or [facility] at  [phone] to schedule an appointment, or establish care with a primary care doctor or even a specialist and to obtain information about local resources. It is important to your health that you have a primary care doctor.  Please take all medications as directed. We have done our best to select a medication for you that will treat your condition however, all medications may potentially have side-effects and it is impossible to predict which medications may give you side-effects or what those side-effects (if any) those medications may give you.  If you feel that you are having a negative effect or side-effect of any medication you should stop taking those medications immediately and seek medical attention. If you feel that you are having a life-threatening reaction call 911.  Do not drive, swim, climb to height, take a bath, operate heavy machinery, drink alcohol or take potentially sedating medications, sign any legal documents or make any important decisions for 24 hours if you have received any pain medications, sedatives or mood altering drugs during your ER visit or within 24 hours of taking them if they have been prescribed to you.   You can find additional resources for Dentists, hearing aids, durable medical equipment, low cost pharmacies and other resources at [email or web address]  Patient agrees with this plan. Discussed with her strict return precautions, they verbalized understanding. Patient is stable for discharge.

## 2024-07-02 ENCOUNTER — TELEPHONE (OUTPATIENT)
Dept: PAIN MEDICINE | Facility: CLINIC | Age: 39
End: 2024-07-02
Payer: COMMERCIAL
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
## 2025-04-21 ENCOUNTER — HOSPITAL ENCOUNTER (EMERGENCY)
Facility: HOSPITAL | Age: 40
Discharge: HOME | End: 2025-04-21
Attending: EMERGENCY MEDICINE

## 2025-04-21 VITALS
OXYGEN SATURATION: 98 % | WEIGHT: 178 LBS | DIASTOLIC BLOOD PRESSURE: 77 MMHG | HEIGHT: 71 IN | RESPIRATION RATE: 18 BRPM | TEMPERATURE: 99 F | HEART RATE: 54 BPM | BODY MASS INDEX: 24.92 KG/M2 | SYSTOLIC BLOOD PRESSURE: 137 MMHG

## 2025-04-21 DIAGNOSIS — F32.A DEPRESSION, UNSPECIFIED DEPRESSION TYPE: ICD-10-CM

## 2025-04-21 DIAGNOSIS — F41.9 ANXIETY: Primary | ICD-10-CM

## 2025-04-21 LAB
ABSOLUTE EOSINOPHIL (OHS): 0.09 K/UL
ABSOLUTE MONOCYTE (OHS): 0.67 K/UL (ref 0.3–1)
ABSOLUTE NEUTROPHIL COUNT (OHS): 6.89 K/UL (ref 1.8–7.7)
ALBUMIN SERPL BCP-MCNC: 4.6 G/DL (ref 3.5–5.2)
ALP SERPL-CCNC: 87 UNIT/L (ref 40–150)
ALT SERPL W/O P-5'-P-CCNC: 29 UNIT/L (ref 10–44)
AMPHET UR QL SCN: NEGATIVE
ANION GAP (OHS): 10 MMOL/L (ref 8–16)
APAP SERPL-MCNC: <3 UG/ML (ref 10–20)
AST SERPL-CCNC: 23 UNIT/L (ref 11–45)
BARBITURATE SCN PRESENT UR: NEGATIVE
BASOPHILS # BLD AUTO: 0.04 K/UL
BASOPHILS NFR BLD AUTO: 0.4 %
BENZODIAZ UR QL SCN: NEGATIVE
BILIRUB SERPL-MCNC: 0.4 MG/DL (ref 0.1–1)
BILIRUB UR QL STRIP.AUTO: NEGATIVE
BUN SERPL-MCNC: 15 MG/DL (ref 6–20)
CALCIUM SERPL-MCNC: 9.5 MG/DL (ref 8.7–10.5)
CANNABINOIDS UR QL SCN: ABNORMAL
CHLORIDE SERPL-SCNC: 108 MMOL/L (ref 95–110)
CLARITY UR: CLEAR
CO2 SERPL-SCNC: 23 MMOL/L (ref 23–29)
COCAINE UR QL SCN: NEGATIVE
COLOR UR AUTO: YELLOW
CREAT SERPL-MCNC: 0.8 MG/DL (ref 0.5–1.4)
CREAT UR-MCNC: 102.3 MG/DL (ref 23–375)
ERYTHROCYTE [DISTWIDTH] IN BLOOD BY AUTOMATED COUNT: 12.8 % (ref 11.5–14.5)
ETHANOL SERPL-MCNC: <10 MG/DL
GFR SERPLBLD CREATININE-BSD FMLA CKD-EPI: >60 ML/MIN/1.73/M2
GLUCOSE SERPL-MCNC: 113 MG/DL (ref 70–110)
GLUCOSE UR QL STRIP: ABNORMAL
HCT VFR BLD AUTO: 48.6 % (ref 40–54)
HGB BLD-MCNC: 16.1 GM/DL (ref 14–18)
HGB UR QL STRIP: NEGATIVE
HOLD SPECIMEN: NORMAL
IMM GRANULOCYTES # BLD AUTO: 0.04 K/UL (ref 0–0.04)
IMM GRANULOCYTES NFR BLD AUTO: 0.4 % (ref 0–0.5)
KETONES UR QL STRIP: NEGATIVE
LEUKOCYTE ESTERASE UR QL STRIP: NEGATIVE
LYMPHOCYTES # BLD AUTO: 2.32 K/UL (ref 1–4.8)
MCH RBC QN AUTO: 29.2 PG (ref 27–31)
MCHC RBC AUTO-ENTMCNC: 33.1 G/DL (ref 32–36)
MCV RBC AUTO: 88 FL (ref 82–98)
METHADONE UR QL SCN: NEGATIVE
NITRITE UR QL STRIP: NEGATIVE
NUCLEATED RBC (/100WBC) (OHS): 0 /100 WBC
OPIATES UR QL SCN: NEGATIVE
PCP UR QL: NEGATIVE
PH UR STRIP: 5 [PH]
PLATELET # BLD AUTO: 290 K/UL (ref 150–450)
PMV BLD AUTO: 10.6 FL (ref 9.2–12.9)
POCT GLUCOSE: 166 MG/DL (ref 70–110)
POTASSIUM SERPL-SCNC: 3.9 MMOL/L (ref 3.5–5.1)
PROT SERPL-MCNC: 8 GM/DL (ref 6–8.4)
PROT UR QL STRIP: NEGATIVE
RBC # BLD AUTO: 5.51 M/UL (ref 4.6–6.2)
RELATIVE EOSINOPHIL (OHS): 0.9 %
RELATIVE LYMPHOCYTE (OHS): 23.1 % (ref 18–48)
RELATIVE MONOCYTE (OHS): 6.7 % (ref 4–15)
RELATIVE NEUTROPHIL (OHS): 68.5 % (ref 38–73)
SARS-COV-2 RDRP RESP QL NAA+PROBE: NEGATIVE
SODIUM SERPL-SCNC: 141 MMOL/L (ref 136–145)
SP GR UR STRIP: 1.02
UROBILINOGEN UR STRIP-ACNC: NEGATIVE EU/DL
WBC # BLD AUTO: 10.05 K/UL (ref 3.9–12.7)

## 2025-04-21 PROCEDURE — 85025 COMPLETE CBC W/AUTO DIFF WBC: CPT | Performed by: NURSE PRACTITIONER

## 2025-04-21 PROCEDURE — U0002 COVID-19 LAB TEST NON-CDC: HCPCS | Performed by: NURSE PRACTITIONER

## 2025-04-21 PROCEDURE — 82962 GLUCOSE BLOOD TEST: CPT

## 2025-04-21 PROCEDURE — 82077 ASSAY SPEC XCP UR&BREATH IA: CPT | Performed by: NURSE PRACTITIONER

## 2025-04-21 PROCEDURE — 80307 DRUG TEST PRSMV CHEM ANLYZR: CPT | Performed by: NURSE PRACTITIONER

## 2025-04-21 PROCEDURE — 82040 ASSAY OF SERUM ALBUMIN: CPT | Performed by: NURSE PRACTITIONER

## 2025-04-21 PROCEDURE — 99283 EMERGENCY DEPT VISIT LOW MDM: CPT

## 2025-04-21 PROCEDURE — 81003 URINALYSIS AUTO W/O SCOPE: CPT | Performed by: NURSE PRACTITIONER

## 2025-04-21 PROCEDURE — 80143 DRUG ASSAY ACETAMINOPHEN: CPT | Performed by: NURSE PRACTITIONER

## 2025-04-21 RX ORDER — HYDROXYZINE HYDROCHLORIDE 25 MG/1
25 TABLET, FILM COATED ORAL 4 TIMES DAILY PRN
Qty: 28 TABLET | Refills: 0 | Status: SHIPPED | OUTPATIENT
Start: 2025-04-21 | End: 2025-04-28

## 2025-04-21 NOTE — Clinical Note
"Philippe Alaniz (William) was seen and treated in our emergency department on 4/21/2025.  He may return to work on 04/23/2025.       If you have any questions or concerns, please don't hesitate to call.       RN    "

## 2025-04-21 NOTE — FIRST PROVIDER EVALUATION
"Medical screening examination initiated.  I have conducted a focused provider triage encounter, findings are as follows:    Brief history of present illness:  reports anxiety, requesting meds; denies SI/HI but has flight of ideas in triage    Vitals:    04/21/25 1430   BP: 133/83   BP Location: Right arm   Pulse: 97   Resp: 18   Temp: 97.4 °F (36.3 °C)   TempSrc: Oral   SpO2: 99%   Weight: 80.7 kg (178 lb)   Height: 5' 11" (1.803 m)       Pertinent physical exam:  NAD    Brief workup plan:  [employer] called in triage; Psych orders placed    Preliminary workup initiated; this workup will be continued and followed by the physician or advanced practice provider that is assigned to the patient when roomed.  "

## 2025-04-21 NOTE — ED NOTES
"Pt presets to ED with c/o anxiety. Pt states "my anxiety is taking over my life and I need medication." Reports trying to use marijuana to cope with anxiety but since he has been trying to stop using it, his anxiety is getting worse. Has social factors with ex-wife that he contributes to his stress/anxiety. Denies SI/HI/AH/VH. Has become forgetful, and cannot remember "small, easy things." AAOX4. Denies CP, SOB, generalized weakness, abd pain, n/v/d. Skin w/d/i. RR even and unlabored. NAD noted. ED sitter outside of room.   "
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
## 2025-04-22 LAB — POCT GLUCOSE: 121 MG/DL (ref 70–110)
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
## 2025-04-22 NOTE — DISCHARGE INSTRUCTIONS
Thank you for allowing me to participate as part of your health care team, and thank you for choosing [facility].    ANGEL CEE MD  Board Certified in Psychiatry & Addiction Medicine      IN CASE OF SUICIDAL THINKING, call the [place] Suicide Pin-Digitalline Number: 988    988 Suicide & Crisis Lifeline: 988 , 9-795-673-TALK (8255)  [email or web address]           RECOMMENDATIONS & INSTRUCTIONS:     [x] Take all medication, from all providers, as prescribed.  [x] Follow with primary care provider for routine health maintenance and management of medical co-morbidities, as well as any indicated/needed specialists.  [x] If questions or concerns arise, or if experiencing side effects, adverse reactions or worsening symptoms, contact your provider through the [facility] portal at [email or web address], or call [phone] to reach the [facility] main line.  [x] In cases of emergencies, call 911 or 498, or present directly to the emergency department for immediate assistance.  [x] Avoid alcohol intake; findings now indicate that when it comes to alcohol consumption, there is no safe amount that does not affect health.  [x] Abstain from illicit drug use.  [x] Upon discharge from an emergency department or inpatient setting, it is recommended to follow up with an outpatient provider within 1-2 weeks of discharge, but ideally as soon as possible; additionally, if you currently follow with an outpatient provider, expeditiously contact them upon discharge to apprise them of the situation and receive further instructions.      INFORMATION ON MENTAL HEALTH MEDICATIONS:     National [place] of Mental Health:   https://www.nimh.nih.gov/health/topics/mental-health-medications     Web MD:   https://www.webmd.com       RESOURCES:     IN CASE OF SUICIDAL THINKING, call the National Suicide Hotline Number: 988    988 Suicide & Crisis Lifeline: 988  4-954-134-TALK (8255)  Provides 24/7, free and confidential support for  people in distress, prevention and crisis resources for you or your loved ones, and best practices for professionals.    Call, text or chat.  https://Aplica.ClickN KIDS     National Action Cornucopia for Suicide Prevention: the National Action Cornucopia for Suicide Prevention (Action Cornucopia) is the nations public-private partnership for suicide prevention, working with more than 250 national partners.   https://theCarmineallArgon 1 [employer] Facility.org     National Strategy for Suicide Prevention & Risk Mitigation:  https://theactionalliance.org/our-strategy/national-strategy-suicide-prevention     [x] Fact Sheet:   https://www.[facility].gov/sites/default/files/national-strategy-for-suicide-prevention-factsheet.pdf     [x] Report:   https://www.ncbi.nlm.nih.gov/books/ZFH401417/pdf/Bookshelf_NBK109917.pdf     Suicide Prevention Resource Center: The Suicide Prevention Resource Center (SPR) is the only federally supported resource center devoted to advancing the implementation of the National Strategy for Suicide Prevention. [facility] is funded by the U.S. Department of Health and Human Services' Substance Abuse and Mental Health Services Administration (SAMHSA).  https://www.[facility].org     [x] Safety Plan:   https://HD Fantasy Football.[place].Kybernesis.MakInnovations/wp-content/uploads/2021/08/David-Wilder-Safety-Plan-8-6-21.pdf     [x] Suicide Risk Curve:  https://HD Fantasy Football.[facility].Kybernesis.MakInnovations/wp-content/uploads/2021/08/Eekcflr-rgxy-kgclh-8-6-21.pdf     [place] Mental Health Advocacy Service: the state agency tasked with protecting the legal rights of people with behavioral health diagnoses.  https://mhas.[place].gov     Alcoholics Anonymous (AA): find a meeting near you.  https://www.aa.org     SMI Adviser: resources for individuals and families with serious mental illness.  https://smiadviser.org     National Cornucopia for the Mentally Ill (IAN): the nation's largest grassroots organization dedicated to building better lives for individuals with mental  illness.  https://www.salvador.org/Home     U.S. Department of Health and Human Services (HHS): the mission of HHS is to enhance the health and well-being of all Americans, by providing for effective health and human services and by fostering sound, sustained advances in the sciences underlying medicine, public health, and .   https://www.[facility].gov     Substance Abuse and Mental Health Services Administration ([facility]): [facility] is the agency within [facility] that leads public health efforts to advance the behavioral health of the nation. [facility]'s mission is to reduce the impact of substance abuse and mental illness on [place] communities.   https://www.[facility].gov     National Institutes of Health ([facility]): a part of [facility], [facility] is the largest biomedical research agency in the world.   https://www.nih.gov     National Wilbur on Drug Abuse (FAVIAN): sponsored by the NIH, the mission of FAVIAN is to advance science on drug use and addiction and to apply that knowledge to improve individual and public health.  https://favian.nih.gov     National Wilbur on Alcohol Abuse and Alcoholism (NIAAA): sponsored by the NIH, the mission of NIAA is to generate and disseminate fundamental knowledge about the effects of alcohol on health and well-being, and apply that knowledge to improve diagnosis, prevention, and treatment of alcohol-related problems, including alcohol use disorder, across the lifespan.   https://www.niaaa.nih.gov     National Harm Reduction Coalition: resources for harm reduction, including techniques, strategies, policy, and advocacy.  https://harmreduction.org     The SHARE Approach - A Model for Shared Decision Making:  [x] Fact Sheet  https://www.ahrq.gov/sites/default/files/publications/files/share-approach_factsheet.pdf     AMA Principles of Medical Ethics - Informed Consent & Shared Decision Making:  [x] Chapter  https://www.ama-assn.org/system/files/2019-06/code-of-medical-outliv-mnsivks-5.pdf     Safety  Netting for Primary Care:  [x] Article  https://www.ncbi.nlm.nih.gov/pmc/articles/ISP4682452/pdf/hjwwghp-0362--e70.pdf       MEDICATION MANAGEMENT:     [x] In addition to the potential beneficial effects, the use of any medication or drug (prescribed, over the counter or otherwise) carries with it the risk of potential adverse effects.  Each has a set of typical adverse effects - some common, some rare - but idiosyncratic and unanticipated reactions unique to you are always possible.      [x] It is important to remember that untreated illness can also pose a risk, which must be taken into account when weighing the pros and cons of a medication trial.    [x] Medications and drugs can sometimes interact with each other in the body, leading to adverse effects - it is important that all your providers know all the medications and drugs you take - prescribed, over the counter, or otherwise.  Keep all your practitioners up to date with any changes.  It's always a good idea to keep an up-to-date list in an easily accessible location.    [x] There is an inherent unpredictability to all treatment, including the use of medication.  Unexpected outcomes can occur - keep me up to date with any difficulties you encounter.    [x] It is important to take medication as directed, and to comply fully with the instructions.  Check with the appropriate provider first before adjusting or stopping your medication on your own.    If you require further information pertaining to the issues outlined above, please reach out to your providers through the [facility] portal at [email or web address], or call [phone] to discuss.  See resource list for additional material.     Additional information can be provided pertaining to your diagnosis, intended outcomes, target symptoms for treatment, and possible benefits and risks of medication - you can also access this information through the provided resources.  Possible alternatives to the  current treatment plan (including no treatment) can also be reviewed.      GENERAL HEALTH & WELLNESS:     [x] Establish and follow regularly with a primary care physician for routine health maintenance and management of any medical comorbidities.  [x] Follow a healthy diet, exercise routinely, and monitor weight and metabolic parameters.  [x] Allow adequate time for sleep and practice good sleep hygiene.  [x] Do not operate a motor vehicle or heavy machinery if the effects of medications or the symptoms underlying your condition impair the ability for you to do so safely.    Dietary Guidelines for Americans, 7581-9816:  U.S. Department of Agriculture (USDA)  https://www.dietaryguidelines.gov/sites/default/files/2020-12/Dietary_Guidelines_for_Americans_2020-2025.pdf#page=31     The Nutrition Source:  [employer]  https://www.[facility].[place].[facility]/nutritionsource       SLEEP HYGIENE:     Follow these tips to establish healthy sleep habits:  [x] Keep a consistent sleep schedule. Get up at the same time every day, even on weekends or during vacations.  [x] Set a bedtime that is early enough for you to get at least 7-8 hours of sleep.  [x] Don't go to bed unless you are sleepy.  [x] If you don't fall asleep after 20 minutes, get out of bed. Go do a quiet activity without a lot of light exposure. It is especially important to not get on electronics.  [x] Establish a relaxing bedtime routine.  [x] Use your bed only for sleep and sex.  [x] Make your bedroom quiet and relaxing. Keep the room at a comfortable, cool temperature.  [x] Limit exposure to bright light in the evenings.  [x] Turn off electronic devices at least 30 minutes before bedtime.  [x] Don't eat a large meal before bedtime. If you are hungry at night, eat a light, healthy snack.  [x] Exercise regularly and maintain a healthy diet.  [x] Avoid consuming caffeine in the afternoon or evening.  [x] Avoid consuming alcohol before bedtime.  [x] Reduce  your fluid intake before bedtime.    QUICK TIPS FOR BETTER SLEEP  Reduce smartphone usage Create and maintain a nightly ritual Avoid caffeine 4-6 hours before sleeping Don't eat or drink too much at bedtime Sleep at the same time every night        American Academy of Sleep Medicine - Healthy Sleep Habits:  https://sleepeducation.org/healthy-sleep/healthy-sleep-habits     American Academy of Sleep Medicine - Bedtime Calculator:  https://sleepeducation.org/healthy-sleep/bedtime-calculator     American Academy of Sleep Medicine - Cognitive Behavioral Therapy for Insomnia (CBT-I):  https://sleepeducation.org/patients/cognitive-behavioral-therapy     American Academy of Sleep Medicine - Insomnia:  https://sleepeducation.org/sleep-disorders/insomnia       ALCOHOL & DRUG USE COUNSELING:     Preventing Excessive Alcohol Use (CDC):  https://www.cdc.gov/alcohol/fact-sheets/moderate-drinking.htm#:~:text=To%20reduce%20the%20risk%20of,days%20when%20alcohol%20is%20consumed.     [x] Alcohol consumption is associated with a variety of short- and long-term health risks, including motor vehicle crashes, violence, sexual risk behaviors, high blood pressure, and various cancers (e.g., breast cancer).  [x] The risk of these harms increases with the amount of alcohol you drink. For some conditions, like some cancers, the risk increases even at very low levels of alcohol consumption (less than 1 drink).  [x] To reduce the risk of alcohol-related harms, the 4714-4523 Dietary Guidelines for Americans recommends that adults of legal drinking age can choose not to drink, or to drink in moderation by limiting intake to 2 drinks or less in a day for men or 1 drink or less in a day for women, on days when alcohol is consumed.  [x] The Guidelines also do not recommend that individuals who do not drink alcohol start drinking for any reason and that if adults of legal drinking age choose to drink alcoholic beverages, drinking less is better for  health than drinking more.  [x] The Guidelines note that some people should not drink alcohol at all, such as:  - If they are pregnant or might be pregnant.  - If they are younger than age 21.  - If they have certain medical conditions or are taking certain medications that can interact with alcohol.  - If they are recovering from an alcohol use disorder or if they are unable to control the amount they drink.  [x] The Guidelines also note that not drinking alcohol is the safest option for women who are lactating. Generally, moderate consumption of alcoholic beverages by a woman who is lactating (up to 1 standard drink in a day) is not known to be harmful to the infant, especially if the woman waits at least 2 hours after a single drink before nursing or expressing breast milk. Women considering consuming alcohol during lactation should talk to their healthcare provider.  [x] The Guidelines note, Emerging evidence suggests that even drinking within the recommended limits may increase the overall risk of death from various causes, such as from several types of cancer and some forms of cardiovascular disease. Alcohol has been found to increase risk for cancer, and for some types of cancer, the risk increases even at low levels of alcohol consumption (less than 1 drink in a day).  [x] Although past studies have indicated that moderate alcohol consumption has protective health benefits (e.g., reducing risk of heart disease), recent studies show this not to be true.  [x] Its important to focus on the amount people drink on the days that they drink. Even if women consume an average of 1 drink per day or men consume an average of 2 drinks per day, binge drinking increases the risk of experiencing alcohol-related harm in the short-term and in the future.    Drinking Levels Defined (NIAAA):  https://www.niaaa.nih.gov/alcohol-health/overview-alcohol-consumption/moderate-binge-drinking     Drinking in Moderation:  According  "to the "Dietary Guidelines for Americans 7678-3001, U.S. Department of Health and Human Services and U.S. Department of Agriculture, adults of legal drinking age can choose not to drink or to drink in moderation by limiting intake to 2 drinks or less in a day for men and 1 drink or less in a day for women, when alcohol is consumed. Drinking less is better for health than drinking more, and findings now indicate that when it comes to alcohol consumption, there is no safe amount that does not affect health.    Binge Drinking:  NIAAA defines binge drinking as a pattern of drinking alcohol that brings blood alcohol concentration (DENIA) to 0.08 percent - or 0.08 grams of alcohol per deciliter - or higher.  For a typical adult, this pattern corresponds to consuming 5 or more drinks (male), or 4 or more drinks (female), in about 2 hours.    The Substance Abuse and Mental Health Services Administration (SAMHSA), which conducts the annual National Survey on Drug Use and Health (NSDUH), defines binge drinking as 5 or more alcoholic drinks for males or 4 or more alcoholic drinks for females on the same occasion (i.e., at the same time or within a couple of hours of each other) on at least 1 day in the past month.    Heavy Alcohol Use:  NIAAA defines heavy drinking as follows:  - For men, consuming more than 4 drinks on any day or more than 14 drinks per week.  - For women, consuming more than 3 drinks on any day or more than 7 drinks per week.     [facility] defines heavy alcohol use as binge drinking on 5 or more days in the past month.    Patterns of Drinking Associated with Alcohol Use Disorder:  Binge drinking and heavy alcohol use can increase an individual's risk of alcohol use disorder.    Certain people should avoid alcohol completely, including those who:  - Plan to drive or operate machinery, or participate in activities that require skill, coordination, and alertness.  - Take certain over-the-counter or prescription " medications.  - Have certain medical conditions.  - Are recovering from alcohol use disorder or are unable to control the amount that they drink.  - Are younger than age 21.  - Are pregnant or may become pregnant.    U.S. Standard Drink  12 oz beer   (5% ABV) 8 oz malt liquor   (7% ABV) 5 oz wine   (12% ABV) 1.5 oz 80-proof distilled spirit  (40% ABV)        Heroin use harm reduction:  1. Carry naloxone. When using heroin, make sure you have at least one dose of naloxone - the overdose reversal drug - and have it in plain view. Understand how to give it.  2. Try a small dose first. It is best to first try a small amount of the heroin to check the effect.  3. Dont use heroin alone. Always use heroin with someone else and take turns while using.    It is possible to overdose with heroin whether you are snorting, injecting or using it in another form.    Signs of an overdose or emergency:   - The person is awake but unable to talk.  - Their body is limp.  - Their breathing is shallow or slow or stopped.  - Their skin is pale, ashen or clammy/sweaty.  - They are unconscious.    In case of emergency, give naloxone. If you suspect the heroin may contain fentanyl, administer more than one dose. Seek medical help even if naloxone has been given. Call 911 for help.      ADDICTION & RECOVERY:     [x] Addiction is a chronic medical illness, and as such, requires treatment through the lifespan  [x] Individuals with a history of alcohol or drug use disorder should maintain sobriety and a recovery lifestyle, as failure to do so can lead to relapse and progression of illness  [x] As part of a recovery lifestyle, it is advised to routinely attend mutual self-help groups (12 step or equivalent) and to work with a sponsor  [x] For those with a history of alcohol or drug use disorder, it is important that all members of your treatment team - regardless of specialty - are fully apprised of your history and recovery plan moving  forward.    For those struggling with active addiction, [facility] is an intensive outpatient addiction rehabilitation program located at main [place] on [facility] - please call [phone] to speak with an  about enrolling in the program.      ADHD TREATMENT AND STIMULANT MEDICATIONS:     NIH Prescription Stimulants Drug Facts  CMS Stimulant and Related Medications: Use in Adults  SHERRIE Drug Fact Sheets: Stimulants  FDA Drug Safety Communication: Stimulants  [facility] ADHD  WebMD ADHD Medications and Side Effects  [facility]: ADHD Medication      SHARED DECISION MAKING & INFORMED CONSENT:     Shared medical decision making and informed consent are the hallmark and bedrock of excellent clinical care.  During the encounter, shared medical decision making was employed and informed consent was obtained, to the degree possible, whenever feasible, appropriate and relevant. Those interventions are supplemented here with written materials, detailing the topics in more depth.       PSYCHOEDUCATION:     Psychoeducation pertaining to the following -     Diagnosis Etiology Disease Processes Natural Progression   Treatment Options Time Course Safety Netting Informed Consent   Intended Benefits of Medication Expectable Adverse Effects Target Symptoms for Treatment Alternatives to Current Treatment   Shared   Decision Making Risk Mitigation Strategies Harm Reduction Techniques Associated Bio-Med Complications     - can be further discussed and reviewed (you can also access additional information through the provided resources in this document).      Effective communication is essential in order to engage in shared medical decision making.  If you had difficulty understanding anything during your encounter or in this supplementary document, please contact your providers through the [facility] portal at [email or web address] or call [phone].     [place]  Dictionary  https://dictionary.aliyah.org/us       It can be easy to miss, forget, or misremember important important information that was discussed during the session - especially when you're stressed, upset, or don't feel well.  If you or a representative have any additional questions, concerns, or topics to discuss - please contact your providers through the [facility] portal at [email or web address] or call [phone].    Memory Loss  https://www.Converser.takokat/brain/memory-loss    Causes of Memory Loss  https://www.CinemaKi/what-causes-memory-loss-9974601    Memory loss: When to seek help  https://www.[facility].org/diseases-conditions/alzheimers-disease/in-depth/memory-loss/art-09370084    Memory, Forgetfulness, and Aging: What's Normal and What's Not?  https://www.megan.nih.gov/health/memory-forgetfulness-and-aging-whats-normal-and-whats-not    Depression and Memory Loss  https://www.Macrotherapy/health/depression/depression-and-memory-loss    The Relationship Between Anxiety and Memory Loss  https://www.[facility] Pharm[facility]/academics/blog-posts/the-relationship-between-anxiety-and-memory-loss     PRESCRIPTION DRUG MANAGEMENT:     Prescription Drug Management entails the following:  [x] The review, recommendation, or consideration without recommendation of medications during the encounter.  [x] Discussion (to the extent possible) with the patient and/or other interested parties of the diagnosis, target symptoms, intended outcomes, and possible benefits and risks of medication, as well as alternatives (including no treatment), if not otherwise known or stated prior.  [x] Discussion (to the extent possible) with the patient and/or other interested parties of possible expectable adverse effects of any proposed individual psychotropic agents, as well as the inherent unpredictability of treatment, if not otherwise known or stated prior.  [x] Informed consent is sought from the patient (and/or guardian/designated  decision maker, if applicable) after a thorough discussion (to the extent possible) of the aforementioned points outlined above.  [x] The provision of counseling (to the extent possible) to the patient and/or other interested parties on the importance of full compliance with any prescribed medication, if not otherwise known or stated prior.    Information on psychotropic medication can be found at:   National [place] of Mental Health: Information on Mental Health Medications      RISK MITIGATION, HARM REDUCTION & SAFETY NETTING:     Risk Mitigation Strategies, Harm Reduction Techniques, and Safety Netting are important interventions that can reduce acute and chronic risk.  As such, opportunities were sought to incorporate psychoeducation and practical advice pertaining to these topics into the encounter, to the degree possible, whenever feasible, appropriate and relevant.  Those interventions are supplemented here with written materials, detailing the topics in more depth.       RISK MITIGATION STRATEGIES:     Risk mitigation strategies are used to reduce the likelihood of future episodes of suicide, homicide, violence, and/or other problematic behaviors (e.g. self-injurious, risky, addictive, compulsive, impulsive). The following are examples of risk mitigation strategies which you can employ in order to reduce your overall burden of risk.     [x] Treatment of underlying psychopathology driving acute and chronic risk to the extent possible.  [x] Use of self administered rating scales and journaling to assist in risk tracking.  [x] Exploration of protective factors to potentially counterbalance risk.  [x] Identification and avoidance of triggers and situations that increase risk, including excessive alcohol and drug use.  [x] Timely follow up and ongoing treatment of mental health issues moving forward.  [x] Full compliance with medication regimen.  [x] A good working knowledge of your medication regimen,  including specific instructions on the administration of the medications.  [x] Consultation with an appropriate medical provider prior to altering or deviating from these instructions on your own.  [x] Active involvement and participation of family and natural support wherever feasible and possible.  [x] Development and review of coping strategies that can be immediately deployed in times of acute crisis.  [x] Implementation of home safety practices and the removal/reduction of access to lethal means (including, but not limited to, firearms, certain types and quantities of medication, poisons, or other methods you may have contemplated or identified).  [x] Collaborative development of a written safety plan with your treatment team and loved ones that can be immediately referred to in times of acute crisis.  [x] Utilization of a safety contract to engage your treatment team and further assess/manage risk.  [x] A good working knowledge of how to access emergency treatment in times of acute crisis.  [x] Utilization of suicide hotlines number (988) and resources in times of crisis.    If you require further information pertaining to the issues outlined above, please reach out to your providers through the [facility] portal at [email or web address], or call [phone] to discuss.  See resource list for additional material.      SAFETY NETTING:     In healthcare, safety netting refers to the provision of information to help patients or carers identify the need to consult a health care professional if a health concern arises or changes.  The relevance of this advice is most obvious with chronic mental illnesses, as their dynamic nature, with symptoms and signs emerging at different times and in different combinations, makes safety netting particularly important.  Specific safety net advice for you includes the following:    [x] The existence of uncertainty. Mental health diagnoses and conditions contain at least some  degree of uncertainty - knowing this, you should feel empowered to reconsult if necessary.  [x] What exactly to look out for. Given the recognised risk of possible deterioration or the development of complications, you should become familiar with the specific clinical features (including red flags) to look out for.    [x] How exactly to seek further help. You should know how and where to seek further help if needed.  Make a plan in advance and keep it handy.  It's also a good idea to share the plan with your treatment providers and loved ones.  [x] What to expect about time course. Mental health diagnoses and conditions often have an expected time course, which is important information for you to know.  However, if your difficulties do not conform to this time line and concerns arise, do not delay seeking further medical advice.    If you require further information pertaining to the issues outlined above, please reach out to your providers through the [facility] portal at [email or web address], or call [phone] to discuss.  See resource list for additional material.      HARM REDUCTION:     Harm Reduction techniques are used in an effort to reduce negative consequences associated with risky and maladaptive behaviors, until cessation of the problematic behaviors can be established.  Harm reduction is best thought of as a journey and not a destination; it is not an endorsement of problematic behavior, but an acknowledgement and recognition of the step-by-step nature of recovery.      Although commonly employed in working with people who suffer with drug addiction, harm reduction can be more broadly applied to any problematic behavior.    Harm Reduction and Substance Abuse:  [x] Incorporates a spectrum of strategies that includes safer use, managed use, abstinence, meeting people who use drugs where theyre at, and addressing conditions of use along with the use itself.  [x] Accepts, for better or worse, that  licit and illicit drug use is part of our world and chooses to work to minimize its harmful effects rather than simply ignore or condemn them.  [x] Understands drug use as a complex, multi-faceted phenomenon that encompasses a continuum of behaviors from severe use to total abstinence, and acknowledges that some ways of using drugs are clearly safer than others.  [x] Calls for the non-judgmental, non-coercive provision of services and resources to people who use drugs and the communities in which they live in order to assist them in reducing attendant harm.  [x] Affirms people who use drugs themselves as the primary agents of reducing the harms of their drug use and seeks to empower them to share information and support each other in strategies which meet their actual conditions of use.  [x] Does not attempt to minimize or ignore the real and tragic harm and danger that can be associated with illicit drug use.  [x] Meets people where they are, but seeks to not leave them there.  [x] Examples of specific interventions include, but are not limited to, narcan (naloxone), medication assisted treatment, syringe access, overdose prevention, and safer drug use techniques.    Key Harm Reduction Strategies: Opioid Use Disorder  [x] Safe Injection Sites & Equipment  [x] Managed Use  [x] Syringe Exchange Programs  [x] Fentanyl Test Strips  [x] Pharmacotherapy/Medication Assisted Treatment  [x] Narcan  [x] Good Gnosticism Laws  [x] Treatment Instead of residential  [x] Diversion Programs  [x] Overdose Education  [x] Abstinence    Whether or not you struggle with substance abuse, any and all opportunities to employ harm reduction techniques to address difficult to change problematic behaviors should be sought and implemented - whenever and wherever feasible, relevant and applicable. Additionally, harm reduction techniques can be applied broadly, and are relevant for a multitude of situations - even those that do not involve  "problematic or maladaptive behaviors.     EXAMPLES OF HARM REDUCTION IN OTHER AREAS  SUN SCREEN SEAT BELTS SPEED LIMITS BIRTH CONTROL        If you require further information pertaining to the issues outlined above, please reach out to your providers through the [facility] portal at [email or web address], or call [phone] to discuss.  See resource list for additional material.      FIREARM SAFETY:     THE SIX BASIC GUN SAFETY RULES  There are six basic gun safety rules for gun owners to understand and practice at all times:  Treat all guns as if they are loaded. Always assume that a gun is loaded even if you think it is unloaded. Every time a gun is handled for any reason, check to see that it is unloaded. If you are unable to check a gun to see if it is unloaded, leave it alone and seek help from someone more knowledgeable about guns.  Keep the gun pointed in the safest possible direction. Always be aware of where a gun is pointing. A "safe direction" is one where an accidental discharge of the gun will not cause injury or damage. Only point a gun at an object you intend to shoot. Never point a gun toward yourself or another person.  Keep your finger off the trigger until you are ready to shoot. Always keep your finger off the trigger and outside the trigger guard until you are ready to shoot. Even though it may be comfortable to rest your finger on the trigger, it also is unsafe. If you are moving around with your finger on the trigger and stumble or fall, you could inadvertently pull the trigger. Sudden loud noises or movements can result in an accidental discharge because there is a natural tendency to tighten the muscles when startled. The trigger is for firing and the handle is for handling.  Know your target, its surroundings and beyond. Check that the areas in front of and behind your target are safe before shooting. Be aware that if the bullet misses or completely passes through the target, it could " strike a person or object. Identify the target and make sure it is what you intend to shoot. If you are in doubt, DON'T SHOOT! Never fire at a target that is only a movement, color, sound or unidentifiable shape. Be aware of all the people around you before you shoot.  Know how to properly operate your gun. It is important to become thoroughly familiar with your gun. You should know its mechanical characteristics including how to properly load, unload and clear a malfunction from your gun. Obviously, not all guns are mechanically the same. Never assume that what applies to one make or model is exactly applicable to another. You should direct questions regarding the operation of your gun to your firearms dealer, or contact the  directly.  Store your gun safely and securely to prevent unauthorized use. Guns and ammunition should be stored separately. When the gun is not in your hands, you must still think of safety. Use an approved firearms safety device on the gun, such as a trigger lock or cable lock, so it cannot be fired. Store it unloaded in a locked container, such as an approved lock box or a gun safe. Store your gun in a different location than the ammunition. For maximum safety you should use both a locking device and a storage container.    ADDITIONAL SAFETY POINTS  The six basic safety rules are the foundational rules for gun safety. However, there are additional safety points that must not be overlooked.  [x] Never handle a gun when you are in an emotional state such as anger or depression. Your judgment may be impaired. If you have acute or chronic suicidal ideation, a suicide plan, or suicidal intent, have firearms removed and your access restricted by a trusted loved one or other responsible individual or agency.  [x] Never shoot a gun in celebration (the Fourth of July or New Year's Renetta, for example). Not only is this unsafe, but it is generally illegal. A bullet fired into the air will  "return to the ground with enough speed to cause injury or death.  [x] Do not shoot at water, flat or hard surfaces. The bullet can ricochet and hit someone or something other than the target.  [x] Hand your gun to someone only after you verify that it is unloaded and the cylinder or action is open. Take a gun from someone only after you verify that it is unloaded and the cylinder or action is open.  [x] Guns, alcohol and drugs don't mix. Alcohol and drugs can negatively affect judgment as well as physical coordination. Alcohol and any other substance likely to impair normal mental or physical functions should not be used before or while handling guns. Avoid handling and using your gun when you are taking medications that cause drowsiness or include a warning to not operate machinery while taking this drug.   [x] The loud noise from a fired gun can cause hearing damage, and the debris and hot gas that is often emitted can result in eye injury. Always wear ear and eye protection when shooting a gun.      GUNS AND CHILDREN - FIREARM OWNER RESPONSIBILITIES    You Cannot Be Too Careful with Children and Guns  [x] There is no such thing as being too careful with children and guns. Never assume that simply because a toddler may lack finger strength, they can't pull the trigger. A child's thumb has twice the strength of the other fingers. When a toddler's thumb "pushes" against a trigger, invariably the barrel of the gun is pointing directly at the child's face. NEVER leave a firearm lying around the house.  [x] Child safety precautions still apply even if you have no children or if your children have grown to adulthood and left home. A nephew, niece, neighbor's child or a grandchild may come to visit. Practice gun safety at all times.  [x] To prevent injury or death caused by improper storage of guns in a home where children are likely to be present, you should store all guns unloaded, lock them with a firearms safety " "device and store them in a locked container. Ammunition should be stored in a location separate from the gun.    Talking to Children About Guns  [x] Children are naturally curious about things they don't know about or think are "forbidden." When a child asks questions or begins to act out "gun play," you may want to address his or her curiosity by answering the questions as honestly and openly as possible. This will remove the mystery and reduce the natural curiosity. Also, it is important to remember to talk to children in a manner they can relate to and understand. This is very important, especially when teaching children about the difference between "real" and "make-believe." Let children know that, even though they may look the same, real guns are very different than toy guns. A real gun will hurt or kill someone who is shot.    Instill a Mind Set of Safety and Responsibility  [x] The American Academy of Pediatrics reports that adolescence is a highly vulnerable stage in life for teenagers struggling to develop traits of identity, independence and autonomy. Children, of course, are both naturally curious and innocently unaware of many dangers around them. Thus, adolescents as well as children may not be sufficiently safeguarded by cautionary words, however frequent. Contrary actions can completely undermine good advice. A "Do as I say and not as I do" approach to gun safety is both irresponsible and dangerous.  [x] Remember that actions speak louder than words. Children learn most by observing the adults around them. By practicing safe conduct you will also be teaching safe conduct.    Safety and Storage Devices  [x] If you decide to keep a firearm in your home you must consider the issue of how to store the firearm in a safe and secure manner. There are a variety of safety and storage devices currently available to the public in a wide range of prices. Some devices are locking mechanisms designed to keep the " firearm from being loaded or fired, but don't prevent the firearm from being handled or stolen. There are also locking storage containers that hold the firearm out of sight. For maximum safety you should use both a firearm safety device and a locking storage container to store your unloaded firearm.   Two of the most common locking mechanisms are trigger locks and cable locks. Trigger locks are typically two-piece devices that fit around the trigger and trigger guard to prevent access to the trigger. One side has a post that fits into a hole in the other side. They are locked by a key or combination locking mechanism. Cable locks typically work by looping a strong steel cable through the action of the firearm to block the firearm's operation and prevent accidental firing. However, neither trigger locks nor cable locks are designed to prevent access to the firearm.   [x] Smaller lock boxes and larger gun safes are two of the most common types of locking storage containers. One advantage of lock boxes and gun safes is that they are designed to completely prevent unintended handling and removal of a firearm. Lock boxes are generally constructed of sturdy, high-grade metal opened by either a key or combination lock. Gun safes are quite heavy, usually weighing at least 50 pounds. While gun safes are typically the most expensive firearm storage devices, they are generally more reliable and secure.     Remember: Safety and storage devices are only as secure as the precautions you take to protect the key or combination to the lock.    RULES FOR KIDS  Adults should be aware that a child could discover a gun when a parent or another adult is not present. This could happen in the child's own home; the home of a neighbor, friend or relative; or in a public place such as a school or park. If this should happen, a child should know the following rules and be taught to practice them.   Stop  The first rule for a child to follow if  he/she finds or sees a gun is to stop what he/she is doing.  Don't Touch!  The second rule is for a child not to touch a gun he/she finds or sees. A child may think the best thing to do if he/she finds a gun is to pick it up and take it to an adult. A child needs to know he/she should NEVER touch a gun he/she may find or see.  Leave the Area  The third rule is to immediately leave the area. This would include never taking a gun away from another child or trying to stop someone from using gun.  Tell an Adult  The last rule is for a child to tell an adult about the gun he/she has seen. This includes times when other kids are playing with or shooting a gun.     METHODS OF CHILDPROOFING YOUR FIREARM  As a responsible handgun owner, you must recognize the need and be aware of the methods of childproofing your handgun, whether or not you have children.  Whenever children could be around, whether your own, or a friend's, relative's or neighbor's, additional safety steps should be taken when storing firearms and ammunition in your home.  [x] Always store your firearm unloaded.  [x] Use a firearms safety device AND store the firearm in a locked container.  [x] Store the ammunition separately in a locked container.  Always storing your firearm securely is the best method of childproofing your firearm; however, your choice of a storage place can add another element of safety. Carefully choose the storage place in your home especially if children may be around.  [x] Do not store your firearm where it is visible.  [x] Do not store your firearm in a bedside table, under your mattress or pillow, or on a closet shelf.  [x] Do not store your firearm among your valuables (such as jewelry or cameras) unless it is locked in a secure container.  [x] Consider storing firearms not possessed for self-defense in a safe and secure manner away from the home.    Everytown for Gun Safety:  https://www.everytown.org       Gun Violence:  Prediction, Prevention and Policy  American Psychological Association Panel of Experts Report  https://www.apa.org/pubs/reports/gun-violence-report.pdf     If you require further information pertaining to any of the issues outlined above, please reach out to your providers through the [facility] portal at [email or web address], or call [phone] to discuss.  See resource list for additional material.      IN CASE OF SUICIDAL THINKING, call the [facility] Mobile Suicide Hotline Number: 988    988 Suicide & Crisis Lifeline: 988 , 6-270-706-TALK (8255)  Provides 24/7, free and confidential support for people in distress, prevention and crisis resources for you or your loved ones, and best practices for professionals.    Call, text or chat.  https://QRGL           REFERRAL RECOMMENDATIONS FOR SUBSTANCE ABUSE & MENTAL HEALTH      IN CASE OF SUICIDAL THINKING, call the [facility] Mobile Suicide Saploline Number: 988    988 Suicide & Crisis Lifeline: 988 , 7-854-369-KPZG (8255)  https://QRGL       SUBSTANCE ABUSE:     [facility] (formerly known as the ABU)  [x] [phone], Option 2  [x] [address], [place] [address]  [x] [email or web address]  [x] [facility] delivers comprehensive and collaborative treatment for alcohol and substance use disorders.  Excellent program for working professionals or anyone else seeking recovery.  [x] Requires insurance approval prior to starting program, call number above for more information.  [x] Intensive Outpatient Rehabilitation Program - M-F 9am-3pm - daily groups with psychologists and social workers, sessions with MDs 3x per week   [x] Ambulatory detox and dual diagnosis available      SUBOXONE:     NOTE: some Suboxone clinics require their clients to participate in a structured program (such as an IOP) in order to be prescribed Suboxone.  Some clinics have a long waiting list.  Most of  these clinics do not accept walk-in clients, so call first to to learn what must be done to get started on Suboxone.    [facility] - [phone] (can do Sublocade)  [address], [place] [address]    [facility]  [address] [place], [place]  [phone]    [facility] - [phone] (can do Sublocade)  [address] [place] [address]    [facility]  [address] [place]  [phone]     [facility] (very short waiting list, may accept some walk-in's but call first if possible)  [address], [place] [id number] [phone]; [phone]    [facility]   [address] [place]    [phone]    [facility] (can usually be seen within a week but is cash only for appointment)  [address], [place]    [facility] ([facility])  [address] [place], [place]  [phone]    [facility] ([facility])  [address] [place] [address]  [phone]    [place], [place]:    [facility] - [address] [place] - [place] [address] - Tel: [phone]    Binu Belcher - [address] [place] [address] - Tel: [phone]    Adalid Chacon - [address] [place] [address] - Tel: [phone]    Leo Canada - [address] [address] - Tel: [phone]    Federico Walsh - [address] [place] [address] - Tel: [phone]    [place], [place]:     Dr. Tawnya Rudd and Dr. Rico Caballero - [address], [place], [place] - Tel: [phone]    Dr. Ruthie Bourne - [address] [place], [place] - Tel: [phone]    Dr. Ahsan Schmid - Tel: [phone]    Dr. Bala Pickens - [facility] - [phone]      METHADONE:     [facility] (the only methadone clinic in the city, has two locations)  [x] [place] - [address] [place] [address], [phone]  [x] [facility] [place] [address], [phone]      12 STEP PROGRAMS (and similar):     Alcoholics  Anonymous (local)  [x] [phone]  [x] www.[email or web address] for schedules for in-person and online meetings  [x] There are AA meetings throughout the day all over town  [x] AA costs nothing to attend; they pass a basket for donations but this is not required    Narcotics Anonymous  [x] [phone]  [x] www.noana.org  [x] There are NA meetings throughout the day all over town  [x] NA costs nothing to attend; they pass a basket for donations but this is not required    Alcoholics Anonymous Online Intergroup (national)  [x] www.aa-intergroup.org  [x] Good resource for large, nation-wide meetings  [x] Can also attend smaller, local meetings in other cities  [x] Countless meetings all day and all night  [x] AA costs nothing to attend; they pass a basket for donations but this is not required    [facility] - 24/7 zoom meetings for women and coed - sign on anytime, anywhere!  [email or web address]/33-6-bfwooezk/    Online Intergroup of AA - [address] [facility] [place] Meeting - 24/7 zoom meetings  https://aa-intergroup.org/[email or web address]    LOOKING FOR AN ALTERNATIVE TO 12 STEP PROGRAMS - check out:  [facility]: https://www.smartrecovery.org/about-us  [facility]: [email or web address]      DETOX UNITS (USUALLY 5-7 DAYS):     [facility]: [address], [place]  [phone], call first to ensure bed availability    [facility]: [address] [place]  [phone], Option 1, call first to ensure bed availability    [facility]: [address], [place]  [phone] (intake by appointment only)    [facility]: [address], [place]  [phone]      INTENSIVE OUTPATIENT PROGRAMS:     [facility] (formerly known as the [facility])  [x] [phone], Option 2  [x] [address], [place] [address]  [x] [email or web address]  [x] [facility] delivers comprehensive and collaborative  treatment for alcohol and substance use disorders.  Excellent program for working professionals or anyone else seeking recovery.  [x] Requires insurance approval prior to starting program, call number above for more information.  [x] Intensive Outpatient Rehabilitation Program - M-F 9am-3pm - daily groups with psychologists and social workers, sessions with MDs 3x per week   [x] Ambulatory detox and dual diagnosis available    [facility] Intensive Outpatient Program  [x] [phone]  [x] [facility] (the clinic not on [facility] main [facility])  [x] Call number above for more info and to check insurance requirements    [facility]  [address] [place], [place] [address] [phone]    [facility]:  [address] [facility], [address]?, [place], [place] [address]?, [phone]  [address] [facility]?, [place], [place] [address]?, [phone]    RESIDENTIAL REHABS (USUALLY 28 DAYS):     [facility]: [address], [phone]    [facility]: [address] [facility]  [phone] (intake by appointment only)    [facility] (men only) [address] [facility], [phone]    [facility] (Female only) [address] [facility], [phone]    [facility]: [address], [facility], men's program [phone], women's program [phone]    [facility]: [address], [place], [phone]    [facility]: [address], [place], [place], [phone]    [facility]: Men only, [phone], [address] [place] [facility]: [address], [place], [place], [phone]    [facility]: [address] [place] [address],  [phone]  New Location: [address] [place] [address] [address], [place] [address], [phone]    [facility]:   ?[address]?[place], [place] [address]?[phone]    [facility]: [address], [place] [address], [phone]    [place]: [place],  [phone]     [facility]: [place], [place], [phone]    [facility]: [place], [place], [phone]    [facility]: [place], [place], [phone]    [place]: [place], [phone]    Nguyen [place]: [address], [place] [address], [phone]    COMMUNITY ADDICTION CLINICS:     [facility]: [address] [facility], [address] [place], [phone] -or- [address] [place] [address]    [facility] [place]: [address], [place]  [address]     [facility]: Clinics [phone]; Crisis [phone]    [facility]: [address] [place] [address]    [facility]: [address] [place] [address]    [facility]: [address] [place] [address],    [facility] Behavior[facility] Center: 2nd Floor [address] [place] [address]    [facility]: [address] [place] [address]    [facility], [address] [place] [address]    [facility]: [address] [facility] [address] [facility], [phone]  (serves youth 16-23 years old)    [facility]: [facility]/ [phone]    [facility]: [address] [facility], [phone]    [facility]: [address], [phone]    [facility]: [address] [facility] [facility], [address], [phone]     [facility]: [address] [facility], [place], [phone], [phone]    RESOURCES IN OTHER [facility]:     [facility]: [address] [place], [phone], [phone]    [facility]: [address] [facility], [address], [phone]    [facility], [address] [place] [address], [phone]    [facility]  "Behavioral Health: [address] [facility], [place], [phone]    [facility]3] Behavioral Health, [address] [facility], [phone] ([facility])    [facility], [address] [facility], [phone] ([facility])    [facility], [address], [address], [place], [phone] ([place], [place], and [facility])    [facility], [address], [place], [phone] ([facility])    [facility] Center[facility] [phone], [phone]    [facility], [address] [facility], [facility], 232 [facility]., [address], [place] [facility], [address] [facility], [facility] Behavioral [facility], 500 [facility] [address]., [place] [facility], [address], [facility] Human Services, [address], [address] [phone]    [facility], [address] [facility] [phone]    [facility], [address] [facility], [phone]    [facility] for Addiction Recovery, [address] [place] [address], [phone]      Kinyarwanda SPEAKING (en español):     Información de la reunión de Alcohólicos Anónimos  [facility], 10:00 am  Habla español  Esta reunión está abierta y cualquiera puede asistir.    Persian speaking Alcoholics anonymous meetings:  El "Michael [place] AA Skype" es un michael on line de Alcohólicos Anónimos en español. El michael es cliff, gratuito y virtual a través de Skype Audio. El michael funciona mediante beth llamada grupal de voz, por lo que no se utiliza la videollamada, ni se pueden fabian las imágenes o rostros de los participantes. Hace mary años y medio abrimos el primer Michael de AA por [place] en [place], yoni actualmente asisten personas desde [place], " [place], [place], [place], [place], [place], [place], [place], [place], [place], [place] y [place], entre otros.    El tylor es muy útil para los alcohólicos que residen en lugares donde no se celebran reuniones de AA, o residen en lugares donde las reuniones de AA son un número limitado de días a la semana, o para aquellos compañeros que se hayan de viaje o que, por cualquier motivo, se hayan convalecientes y no pueden desplazarse. Todos los días nos reuniones a las 21:00 (hora española)    Podéis obtener más información sobre el tylor y mayra sesiones en la página web [email or web address]      MENTAL HEALTH:     [facility] - Outpatient Clinic  [phone]    [facility] - General Psychiatry Intensive Outpatient Program  [facility] (formerly known as the BMU)  [phone], option 3    [facility] - Dual Diagnosis Intensive Outpatient Program  [facility] (formerly known as the ABU)  [phone], option 2      [facility] MENTAL HEALTH CENTERS:     [facility]  ([facility])  Serves [facility] residents.  Serves uninsured patients & those with Medicaid.  Main location: [address] [place] [id number] [phone]  Walk-in's available during regular business hours.  24/7 Crisis Line: [phone]    [facility]  (aka [facility])  Serves [facility] residents.  Serves uninsured patients, those with Medicaid and some private plans.  Walk-in's available during regular business hours.  Primary care services available as well.  [facility]: [address] [place] [address];  [phone]  [facility]: [address] [place] [address];  [phone]  24/7 Crisis Line: [phone]    [facility]  uninsured patients & those with Medicaid, call for more info.  Primary care, pediatrics, HIV treatment, and dentistry services available as well.  Three locations.  [phone]    [facility] [place]?Corporate Office  Serves patients with Medicaid, Medicare, and private insurance  [address] [address]  [phone]    [facility]  Serves uninsured on a sliding scale, as well as Medicaid, Medicare, and private plans.  Eight locations around the [facility] area.  [phone]    [facility]  Serves uninsured patients & those with Medicaid, private insurances.  Primary care available as well.  [phone]  [address] [facility] [place] [address]    [facility]  Serves veterans who were honorably discharged.  [address] [place] [address]  [phone]  24/7 Veterans Crisis Line: [phone] (Press 1)    If you have private insurance and need to find a specialist, please contact your insurance network to request a list of providers covered by your benefits.      MENTAL HEALTH/ADDICTIVE DISORDERS:     AA ([phone]), NA ([phone])   National Suicide Prevention Lifeline- Call 1-461.916.8704 Available 24 hours everyday  [facility] [phone]; Crisis Line 634-3181 - Call for options A-F:  Intensive Outpatient Treatment/ Day programs   [facility], please contact   [facility], please contact [phone] or [phone] to speak with an admissions counselor.  [facility] Group (Methadone Maintenance)   [address] [place] [address], [phone]  [address] [place] [address] [phone]  [facility], [address] [place] [address], [phone]  [facility], [place] [phone]  [facility] please contact [phone]  [place]  [facility]al Center, [address], 4th [place] [address] Phone: [phone]   [facility]  [place] Office: [address] [place] [address], [phone]  [place] Office: [address] [facility], [address], [place] [address], [phone]  [place] Office: [address] [facility], [place] [address] [phone]    Outpatient Substance Abuse Treatment   [facility] Group (Methadone Maintenance)   [address] [place] [address], [phone]  [address] [place] [address] Ave, [place] [address] [phone]  [facility], [address] [place] [address], [phone]  [facility]  [place] Office: [address] [place] [address], [phone]  [place] Office: [address] [facility], [address], [place] [address], [phone]  [place] Office: [address] [facility], [place] [address] [phone]  [facility] [place] [facility], [address] [place] [address] [phone]   [facility] for [facility] Recovery, [address] [facility], [address], [phone]  [facility] Recover, [address] [place], [place] [phone]    Residential Substance Abuse Treatment   [facility] [address] [facility], [phone] or [phone]  [facility], [address] [employer] [address], [phone]  [facility] (men only) [address] [place] [address], [phone]  [facility] at the [facility] (women only) [address] [place] [address] [phone]  [facility], [address] [place] [address] [phone]  [facility] (women only), intakes at [address] [employer], [phone]  [facility] (7-day program, $100, [address], [phone], [phone], [phone])  [facility] (Men only, [phone]), [address], [place] (Vets*/Non-Vets)  [facility] (Men only, $400/month program fee) [address] [place], [place] [address]juan manuel Romo, [phone]  [facility] (Women over age 39 only), [address] [facility], [phone]    Out of Area:    [place], [address], [place], [place] ([phone])  [facility] (men only), [address] [facility] [address], [phone]  [facility], [address] [place], [place] ([phone])  [place], [address] [place], [place] ([phone])  [facility] Addiction Recovery Center, [address] [facility], [phone]  [facility] (Women only, has to have mental illness, can be homeless or substance abuser), [phone]        DOMESTIC VIOLENCE RESOURCES:     Advocacy  [facility] (NOFJC)  [address] [address]    [facility] ? [phone]  Services provided: emergency shelter, individual advocacy, information and referrals, group support, children's program, medical advocacy, forensic medical exams, primary care, legal assistance, counseling, safety planning, and caregiver support    [facility] HEALING AND EMPOWERMENT [place]  Confidential location  [facility] ? [phone]  Services provided: short term emergency shelter, all services provided are free of charge    [facility] FOR COMMUNITY ADVOCACY  Multiple locations in [facility], [facility], [place], and [facility] ([place], [place], and [place])    [facility] ? [phone]  Services provided: emergency shelter, individual advocacy, information and referrals, group support, children's program, medical advocacy, legal assistance in obtaining restraining orders, counseling, safety planning, and caregiver support    [facility]   Emergency Shelter   [phone]  Emergency Services ,Legal and Financial Assistance Services ,Housing Services ,Support Services     [facility]   [phone]  Emergency Services ,Counseling Services , Housing Services ,Support Services ,Children's Services     WOMEN WITH A VISION  [address] [facility] [place], [place] [address]  WWAV ? [phone]  [phone]  Services provided: advocacy, health education and supportive services, specializing in free healing services for marginalized groups, including LGBTQ individuals and sex workers    SEXUAL TRAUMA AWARENESS AND RESPONSE (STAR)  [address] [place] [address]    STAR ? [phone] 181-[facility]  Services provided: individual advocacy, information and referrals, group support, medical advocacy, legal assistance, counseling, and safety planning for survivors of sexual assault    [facility])  [address] [place] [address]  [facility] Forensic Program ? [phone]  Services provided: free forensic medical exams for sexual assault and domestic violence, which can be performed up to 5 days after an incident. It is not necessary to make a police report to receive a forensic medical exam    Legal  [facility]  [address] [address]  [facility] ? [phone]  Services provided: free emergency legal representation for survivors of doemstic violence residing in [facility] [place] [facility]. Legal services may include temporary restraining orders, temporary child support, custody, and use of property    [facility] LEGAL SERVICES (SLLS)  [address] [facility] 600[place] [address]  SLLS ? [phone]  Services provided: free legal representation for survivors of domestic violence residing in [place] [facility]. Legal services may include temporary child support, custody, and divorce      HOTLINES:     [facility] DOMESTIC VIOLENCE HOTLINE  [phone]    Services provided: free and confidential hotline for victims and survivors of domestic violence. All calls will be routed to a domestic violence service provided in the victim or survivor's area    NATIONAL HUMAN TRAFFICKING HOTLINE  ([phone]    Services provided: national anti-trafficking hotline serving victims and survivors of human trafficking. Provides information about local resources, and  access to safe space to report tips, seek services, and ask for help    VIA LINK  211 or [phone]    Service provided: counselors can provide crisis counseling. Counselors can also provide information and referrals to programs which can help with needs such as food, shelter, medical care, financial assistance, mental health services, substance abuse treatment, senior services, childcare, and more      HOMELESS SHELTERS:      Homeless shelters  [facility]  Emergency shelter for individuals and families  [address]  [phone]  [facility]  Emergency shelter for men only  Meals daily 6am, 2pm, & 6pm  Clothing, case management M-F by appointment (ID/job/housing/legal assistance), mail  [address] [facility]  [phone]  [facility]  Emergency shelter for men  [address] [facility]  [phone]  Emergency shelter for women  [address] [facility]  [phone]  Breakfast & lunch daily, dinner M-F  Case management, job counseling services   [facility]  Emergency shelter for teens and young adults up to 22yo  [address] [facility]  [phone]  [place] [facility]  Emergency shelter for women over 19yo and their kids  [address] [place], [place] [address] [phone]  [facility]  Day program, meals M-F 1PM (arrive early)  Showers, laundry, hygiene kits, showers, phones, , notary services, case management, ID assistance  [address] [facility]  [phone] M-F 8am-2:30pm  [employer]  Day program  MTWF 7:30am-3:30pm,  8:30am-3:30pm  Crisis intervention, employment assistance, food/clothing, hygiene kits, bus tokens, mail  [address] [facility] [address]  [phone]  [facility]  Mobile outreach for homeless persons in [facility]  [phone]  [facility]care for the Homeless  Primary healthcare, case management, dental services, TB placement  Call ahead  [address] [facility] 2nd Floor  [phone]  [facility]  Connects homeless people with their  loved ones in other cities by providing transportation costs   [phone]      [place] RESOURCES:     [place] Mobile Mental Health Crisis Response Team:    Region 12 ([place], [place], [place], and [facility]) ([facility])  [phone]      Outpatient Mental Health & Addiction Clinic Resources for both [facility]:    [facility] Resources  Website: [email or web address].[email or web address]  Main Number: 005-108-8316    [facility] ([facility])  [address] ([address] [facility]) [place] [address]  [phone]    [facility] ([facility])  [address] ([address] [facility] [place]) [place] [address]  [phone]    [facility]  [address] ([address]) [place] [address]  [phone]    [facility]  [address] ([address] [facility] [place] [place] [address]  [phone]      Addiction Treatment Resources for both [facility]:    [facility] (Detox, Residential, PHP, IOP, and Aftercare Programs)  [address] [place] [address]  [phone]    [facility] (Residential, IOP, Transitional Living, and Aftercare Programs)  [address] [place] [address]  [phone]    [facility] (Inpatient, Residential, Detox, IOP, Outpatient, and Aftercare Programs)  [address] [place] [address], [place] [address]  [phone] or toll free at [phone]      Outpatient Mental Health Psychotherapy Resources for both [facility]:    Socorro Curry, LCSW  [address]  [place], [place] [address] [phone]  Specialties: Depression, Anxiety, and Life Transitions    Amanda Mobley, PhD  [address]  [place] [address] [phone]  Specialties: Testing and Evaluation, Education and  Learning Disabilities, and ADHD    Ranjana Olivier, [facility] [address] [place] [address]  [phone]  Specialties: Obsessive-Compulsive (OCD), Depression, and Relationship Issues    Yuko Moscoso [facility] [address] [place] [facility] [place] [address]  [phone]  Specialties: Trauma & PTSD, Mood Disorders, and Anxiety    Yuko Barrett, PhD, [facility]  [facility] [address] [place] [address]  [phone]  Specialties: Family Conflict, Child, and Relationship Issues    Ashley Vargas [facility] Counseling [place] [address] [phone]  Specialties: Anxiety, Depression, and Anger Management        IN CASE OF SUICIDAL THINKING, call the National Suicide Hotline Number: 988    988 Suicide & Crisis Lifeline: 988 , 0-464-624-TALK (8255)  Provides 24/7, free and confidential support for people in distress, prevention and crisis resources for you or your loved ones, and best practices for professionals.    Call, text or chat.  [email or web address]

## 2025-04-28 ENCOUNTER — HOSPITAL ENCOUNTER (EMERGENCY)
Facility: HOSPITAL | Age: 40
Discharge: HOME | End: 2025-04-28
Attending: EMERGENCY MEDICINE

## 2025-04-28 ENCOUNTER — TELEPHONE (OUTPATIENT)
Dept: EMERGENCY MEDICINE | Facility: HOSPITAL | Age: 40
End: 2025-04-28
Payer: COMMERCIAL

## 2025-04-28 VITALS
BODY MASS INDEX: 25.1 KG/M2 | DIASTOLIC BLOOD PRESSURE: 78 MMHG | RESPIRATION RATE: 16 BRPM | HEART RATE: 80 BPM | OXYGEN SATURATION: 100 % | SYSTOLIC BLOOD PRESSURE: 112 MMHG | TEMPERATURE: 98 F | WEIGHT: 180 LBS

## 2025-04-28 DIAGNOSIS — R07.89 CHEST TIGHTNESS: ICD-10-CM

## 2025-04-28 DIAGNOSIS — U07.1 COVID-19: Primary | ICD-10-CM

## 2025-04-28 DIAGNOSIS — K57.90 DIVERTICULOSIS: ICD-10-CM

## 2025-04-28 LAB
ABSOLUTE EOSINOPHIL (OHS): 0.1 K/UL
ABSOLUTE MONOCYTE (OHS): 0.65 K/UL (ref 0.3–1)
ABSOLUTE NEUTROPHIL COUNT (OHS): 2.15 K/UL (ref 1.8–7.7)
ALBUMIN SERPL BCP-MCNC: 4.2 G/DL (ref 3.5–5.2)
ALP SERPL-CCNC: 86 UNIT/L (ref 40–150)
ALT SERPL W/O P-5'-P-CCNC: 35 UNIT/L (ref 10–44)
ANION GAP (OHS): 10 MMOL/L (ref 8–16)
AST SERPL-CCNC: 28 UNIT/L (ref 11–45)
BASOPHILS # BLD AUTO: 0.02 K/UL
BASOPHILS NFR BLD AUTO: 0.4 %
BILIRUB SERPL-MCNC: 0.5 MG/DL (ref 0.1–1)
BILIRUB UR QL STRIP.AUTO: NEGATIVE
BUN SERPL-MCNC: 12 MG/DL (ref 6–20)
CALCIUM SERPL-MCNC: 9.2 MG/DL (ref 8.7–10.5)
CHLORIDE SERPL-SCNC: 102 MMOL/L (ref 95–110)
CLARITY UR: CLEAR
CO2 SERPL-SCNC: 27 MMOL/L (ref 23–29)
COLOR UR AUTO: YELLOW
CREAT SERPL-MCNC: 0.9 MG/DL (ref 0.5–1.4)
CTP QC/QA: YES
CTP QC/QA: YES
ERYTHROCYTE [DISTWIDTH] IN BLOOD BY AUTOMATED COUNT: 12.7 % (ref 11.5–14.5)
GFR SERPLBLD CREATININE-BSD FMLA CKD-EPI: >60 ML/MIN/1.73/M2
GLUCOSE SERPL-MCNC: 162 MG/DL (ref 70–110)
GLUCOSE UR QL STRIP: ABNORMAL
HCT VFR BLD AUTO: 48.1 % (ref 40–54)
HGB BLD-MCNC: 16.6 GM/DL (ref 14–18)
HGB UR QL STRIP: NEGATIVE
HOLD SPECIMEN: NORMAL
IMM GRANULOCYTES # BLD AUTO: 0.01 K/UL (ref 0–0.04)
IMM GRANULOCYTES NFR BLD AUTO: 0.2 % (ref 0–0.5)
KETONES UR QL STRIP: NEGATIVE
LEUKOCYTE ESTERASE UR QL STRIP: NEGATIVE
LIPASE SERPL-CCNC: 37 U/L (ref 4–60)
LYMPHOCYTES # BLD AUTO: 2.07 K/UL (ref 1–4.8)
MCH RBC QN AUTO: 29.9 PG (ref 27–31)
MCHC RBC AUTO-ENTMCNC: 34.5 G/DL (ref 32–36)
MCV RBC AUTO: 87 FL (ref 82–98)
NITRITE UR QL STRIP: NEGATIVE
NUCLEATED RBC (/100WBC) (OHS): 0 /100 WBC
PH UR STRIP: 5 [PH]
PLATELET # BLD AUTO: 210 K/UL (ref 150–450)
PMV BLD AUTO: 10.9 FL (ref 9.2–12.9)
POC MOLECULAR INFLUENZA A AGN: NEGATIVE
POC MOLECULAR INFLUENZA B AGN: NEGATIVE
POCT GLUCOSE: 170 MG/DL (ref 70–110)
POCT GLUCOSE: 190 MG/DL (ref 70–110)
POTASSIUM SERPL-SCNC: 3.9 MMOL/L (ref 3.5–5.1)
PROT SERPL-MCNC: 7.9 GM/DL (ref 6–8.4)
PROT UR QL STRIP: NEGATIVE
RBC # BLD AUTO: 5.55 M/UL (ref 4.6–6.2)
RELATIVE EOSINOPHIL (OHS): 2 %
RELATIVE LYMPHOCYTE (OHS): 41.4 % (ref 18–48)
RELATIVE MONOCYTE (OHS): 13 % (ref 4–15)
RELATIVE NEUTROPHIL (OHS): 43 % (ref 38–73)
SARS-COV-2 RDRP RESP QL NAA+PROBE: POSITIVE
SODIUM SERPL-SCNC: 139 MMOL/L (ref 136–145)
SP GR UR STRIP: 1.02
TROPONIN I SERPL DL<=0.01 NG/ML-MCNC: <0.006 NG/ML
UROBILINOGEN UR STRIP-ACNC: NEGATIVE EU/DL
WBC # BLD AUTO: 5 K/UL (ref 3.9–12.7)

## 2025-04-28 PROCEDURE — 82962 GLUCOSE BLOOD TEST: CPT

## 2025-04-28 PROCEDURE — 63600175 PHARM REV CODE 636 W HCPCS: Mod: JZ,TB | Performed by: NURSE PRACTITIONER

## 2025-04-28 PROCEDURE — 99285 EMERGENCY DEPT VISIT HI MDM: CPT | Mod: 25

## 2025-04-28 PROCEDURE — 25500020 PHARM REV CODE 255: Performed by: EMERGENCY MEDICINE

## 2025-04-28 PROCEDURE — 81003 URINALYSIS AUTO W/O SCOPE: CPT | Performed by: NURSE PRACTITIONER

## 2025-04-28 PROCEDURE — 83690 ASSAY OF LIPASE: CPT | Performed by: NURSE PRACTITIONER

## 2025-04-28 PROCEDURE — 80053 COMPREHEN METABOLIC PANEL: CPT | Performed by: NURSE PRACTITIONER

## 2025-04-28 PROCEDURE — 84484 ASSAY OF TROPONIN QUANT: CPT | Performed by: NURSE PRACTITIONER

## 2025-04-28 PROCEDURE — 85025 COMPLETE CBC W/AUTO DIFF WBC: CPT | Performed by: NURSE PRACTITIONER

## 2025-04-28 PROCEDURE — 87502 INFLUENZA DNA AMP PROBE: CPT

## 2025-04-28 PROCEDURE — 87635 SARS-COV-2 COVID-19 AMP PRB: CPT | Performed by: NURSE PRACTITIONER

## 2025-04-28 PROCEDURE — 25000003 PHARM REV CODE 250: Performed by: NURSE PRACTITIONER

## 2025-04-28 PROCEDURE — 96374 THER/PROPH/DIAG INJ IV PUSH: CPT

## 2025-04-28 PROCEDURE — 96361 HYDRATE IV INFUSION ADD-ON: CPT

## 2025-04-28 RX ORDER — KETOROLAC TROMETHAMINE 30 MG/ML
15 INJECTION, SOLUTION INTRAMUSCULAR; INTRAVENOUS
Status: COMPLETED | OUTPATIENT
Start: 2025-04-28 | End: 2025-04-28

## 2025-04-28 RX ORDER — NAPROXEN 500 MG/1
500 TABLET ORAL 2 TIMES DAILY WITH MEALS
Qty: 14 TABLET | Refills: 0 | Status: SHIPPED | OUTPATIENT
Start: 2025-04-28

## 2025-04-28 RX ORDER — CYCLOBENZAPRINE HCL 10 MG
10 TABLET ORAL 3 TIMES DAILY PRN
Qty: 15 TABLET | Refills: 0 | Status: SHIPPED | OUTPATIENT
Start: 2025-04-28 | End: 2025-05-03

## 2025-04-28 RX ADMIN — KETOROLAC TROMETHAMINE 15 MG: 30 INJECTION, SOLUTION INTRAMUSCULAR; INTRAVENOUS at 01:04

## 2025-04-28 RX ADMIN — SODIUM CHLORIDE 1000 ML: 9 INJECTION, SOLUTION INTRAVENOUS at 01:04

## 2025-04-28 RX ADMIN — IOHEXOL 75 ML: 350 INJECTION, SOLUTION INTRAVENOUS at 02:04
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
## 2025-04-28 NOTE — Clinical Note
"Philippe Alaniz (William) was seen and treated in our emergency department on 4/28/2025.  He may return to work on 04/30/2025.       If you have any questions or concerns, please don't hesitate to call.      Wei Bolivar, DNP"

## 2025-04-28 NOTE — ED TRIAGE NOTES
Pt presents with c/o urinary frequency, flank pain, right sided lower abdominal pain, joint pain, headache.

## 2025-04-28 NOTE — DISCHARGE INSTRUCTIONS
You have been prescribed naprosyn (naproxen sodium), an anti-inflammatory.  Take this medication whether you feel you need it or not.  Do not take ibuprofen, naproxen or other NSAID's medications while taking this medication.     You have been prescribed flexeril (cyclobenzaprine).  You have been given a medication that causes drowsiness.  Do not operate motor vehicles, drink alcohol, or operate heavy machinery while taking this medication.You have been prescribed flexeril (cyclobenzaprine).  You have been given a medication that causes drowsiness.  Do not operate motor vehicles, drink alcohol, or operate heavy machinery while taking this medication.     Return to the Emergency Department for any worsening, change in condition, or any emergent concerns.